# Patient Record
Sex: MALE | Race: WHITE | NOT HISPANIC OR LATINO | Employment: FULL TIME | URBAN - METROPOLITAN AREA
[De-identification: names, ages, dates, MRNs, and addresses within clinical notes are randomized per-mention and may not be internally consistent; named-entity substitution may affect disease eponyms.]

---

## 2020-06-01 ENCOUNTER — TELEPHONE (OUTPATIENT)
Dept: URGENT CARE | Facility: CLINIC | Age: 40
End: 2020-06-01

## 2020-06-01 NOTE — TELEPHONE ENCOUNTER
"Notified patient of xray per result note.  Patient states he is going to \"deal with it\" as he does not have time right now to go to ortho and have to be in a walking boot due to wife's health issues.  Patient asked if he can call back if needed and get a referral for ortho.  I advised that if he could call back in the next week or two it shouldn't be an issue.  Patient agreeable and stated understanding.   "

## 2020-09-02 ENCOUNTER — OFFICE VISIT (OUTPATIENT)
Dept: ORTHOPEDIC SURGERY | Facility: CLINIC | Age: 40
End: 2020-09-02

## 2020-09-02 VITALS — HEIGHT: 68 IN | BODY MASS INDEX: 41.98 KG/M2 | OXYGEN SATURATION: 99 % | HEART RATE: 91 BPM | WEIGHT: 277 LBS

## 2020-09-02 DIAGNOSIS — S82.892S: ICD-10-CM

## 2020-09-02 DIAGNOSIS — S93.422S SPRAIN OF DELTOID LIGAMENT OF LEFT ANKLE, SEQUELA: Primary | ICD-10-CM

## 2020-09-02 PROCEDURE — 99203 OFFICE O/P NEW LOW 30 MIN: CPT | Performed by: ORTHOPAEDIC SURGERY

## 2020-09-03 PROBLEM — S93.422A SPRAIN OF DELTOID LIGAMENT OF LEFT ANKLE: Status: ACTIVE | Noted: 2020-09-03

## 2020-09-03 PROBLEM — S82.892S: Status: ACTIVE | Noted: 2020-09-03

## 2020-09-18 ENCOUNTER — HOSPITAL ENCOUNTER (OUTPATIENT)
Dept: MRI IMAGING | Facility: HOSPITAL | Age: 40
Discharge: HOME | End: 2020-09-18
Admitting: ORTHOPAEDIC SURGERY

## 2020-09-18 DIAGNOSIS — S82.892S: ICD-10-CM

## 2020-09-18 DIAGNOSIS — S93.422S SPRAIN OF DELTOID LIGAMENT OF LEFT ANKLE, SEQUELA: ICD-10-CM

## 2020-09-18 PROCEDURE — 73721 MRI JNT OF LWR EXTRE W/O DYE: CPT

## 2020-09-23 ENCOUNTER — OFFICE VISIT (OUTPATIENT)
Dept: ORTHOPEDIC SURGERY | Facility: CLINIC | Age: 40
End: 2020-09-23

## 2020-09-23 VITALS — BODY MASS INDEX: 41.77 KG/M2 | WEIGHT: 275.57 LBS | HEART RATE: 83 BPM | HEIGHT: 68 IN | OXYGEN SATURATION: 99 %

## 2020-09-23 DIAGNOSIS — M76.822 POSTERIOR TIBIAL TENDINITIS OF LEFT LOWER EXTREMITY: Primary | ICD-10-CM

## 2020-09-23 PROCEDURE — 99212 OFFICE O/P EST SF 10 MIN: CPT | Performed by: ORTHOPAEDIC SURGERY

## 2020-09-23 NOTE — PROGRESS NOTES
ESTABLISHED PATIENT    Patient: Benjamin Rivera  : 1980    Primary Care Provider: Provider, No Known    Requesting Provider: As above    Follow-up (Left ankle MRI follow up. )      History    Chief Complaint: Left ankle pain    History of Present Illness: He returns for follow-up of his left ankle MRI.  It was done 2020 and I reviewed the films and the report.  There are several interesting findings.  With respect to his medial pain, he has synovitis around the posterior tibial and FHL tendons.  Interestingly he appears to just have been on the peroneal tendon.  Distally I can see the longus and the brevis, but proximally at the level of the ankle I only see the longus.  There is a slight amount of fluid around it.  He is not symptomatic here.  There is some abnormality in the anterior talofibular ligament, again he is not symptomatic there.    Current Outpatient Medications on File Prior to Visit   Medication Sig Dispense Refill   • hepatitis A (HAVRIX) 1440 EL U/ML vaccine Inject 1 mL into the appropriate muscle as directed by prescriber. 1 mL 0   • hepatitis A vaccine (VAQTA) 50 UNIT/ML injection Inject 1 mL into the appropriate muscle as directed by prescriber. Repeat in 6 months 1 mL 1     No current facility-administered medications on file prior to visit.       No Known Allergies   No past medical history on file.  Past Surgical History:   Procedure Laterality Date   • WISDOM TOOTH EXTRACTION       Family History   Problem Relation Age of Onset   • No Known Problems Mother    • No Known Problems Father       Social History     Socioeconomic History   • Marital status: Single     Spouse name: Not on file   • Number of children: Not on file   • Years of education: Not on file   • Highest education level: Not on file   Tobacco Use   • Smoking status: Never Smoker   • Smokeless tobacco: Never Used   Substance and Sexual Activity   • Alcohol use: Not Currently   • Drug use: Never   • Sexual activity: Defer  "       Review of Systems   Constitutional: Negative.    HENT: Negative.    Eyes: Negative.    Respiratory: Negative.    Cardiovascular: Negative.    Gastrointestinal: Negative.    Endocrine: Negative.    Genitourinary: Negative.    Musculoskeletal: Positive for arthralgias.   Skin: Negative.    Allergic/Immunologic: Negative.    Neurological: Negative.    Hematological: Negative.    Psychiatric/Behavioral: Negative.        The following portions of the patient's history were reviewed and updated as appropriate: allergies, current medications, past family history, past medical history, past social history, past surgical history and problem list.    Physical Exam:   Pulse 83   Ht 172.7 cm (67.99\")   Wt 125 kg (275 lb 9.2 oz)   SpO2 99%   BMI 41.91 kg/m²   GENERAL: Gait: antalgic with the first few steps       MSK:  Ankle:  ; Left:  Tender medially nontender laterally            NEURO Sensation:  intact     Medical Decision Making    Data Review:   reviewed radiology images and reviewed radiology results    Assessment/Plan/Diagnosis/Treatment Options:   1. Posterior tibial tendinitis of left lower extremity  His deltoid ligament is intact on the MRI but there is synovitis around the posterior tibial tendon and flexor hallucis longus tendon consistent with medial pain.  I would recommend a short leg walking cast.  I explained the usual treatment for posterior tibial tendinitis.  I would definitely recommend the cast because the risk of the foot architecture changing is significant.  I explained to him the appearance of the peroneals, he appears to just have one peroneal tendon.  I would also recommend a cast to help prevent that from getting worse.  He then definitely needs physical therapy to work on side to side strengthening, he needs custom orthotics and I gave him a prescription.  I would recommend 6 weeks short leg walking cast then 6 weeks in a boot with the custom orthotics doing therapy, and 6 weeks of " therapy out of the boot.  He reports he has been feeling better and is not certain that he wants the cast.  His wife is going to have another surgery and he will have to both work and take care of her and their young child.  He is going to think about the options.  I cautioned him about the possibility of the tendons rupturing.  If he decides to pursue the cast I will see him again in 6 weeks later for an exam out of the cast.

## 2020-10-05 ENCOUNTER — TELEPHONE (OUTPATIENT)
Dept: ORTHOPEDIC SURGERY | Facility: CLINIC | Age: 40
End: 2020-10-05

## 2020-10-05 NOTE — TELEPHONE ENCOUNTER
Called patient, he is going to come in tomorrow after his education at the hospital around 11:30.  Marlene

## 2020-10-05 NOTE — TELEPHONE ENCOUNTER
PATIENT CALLED STATED HE WAS TO GET A CAST ON LEFT ANKLE PLACED 10 DAYS AGO AND PATIENT STATED HE WOULD LIKE TO GET A CAST PLACED. PATIENT WOULD LIKE A CALL BACK [phone].

## 2020-10-06 ENCOUNTER — OFFICE VISIT (OUTPATIENT)
Dept: ORTHOPEDIC SURGERY | Facility: CLINIC | Age: 40
End: 2020-10-06

## 2020-10-06 DIAGNOSIS — M76.822 POSTERIOR TIBIAL TENDINITIS OF LEFT LOWER EXTREMITY: Primary | ICD-10-CM

## 2020-10-06 PROCEDURE — 29425 APPL SHORT LEG CAST WALKING: CPT | Performed by: PHYSICIAN ASSISTANT

## 2020-10-06 NOTE — PROGRESS NOTES
Procedure   Cast Application     Date/Time: 10/6/2020 11:24 AM  Performed by: Meri Renner PA-C  Authorized by: Meri Renner PA-C   Location details: left leg  Splint type: short leg  Supplies used: cotton padding (fiberglass)  Post-procedure: The splinted body part was neurovascularly unchanged following the procedure.  Patient tolerance: patient tolerated the procedure well with no immediate complications  Comments: Placed patient in short leg WB fiberglass cast.  Patient was comfortable upon leaving and was instructed to call with any problems.  Mralene

## 2020-10-12 ENCOUNTER — TELEPHONE (OUTPATIENT)
Dept: ORTHOPEDIC SURGERY | Facility: CLINIC | Age: 40
End: 2020-10-12

## 2020-10-12 NOTE — TELEPHONE ENCOUNTER
PATIENT REPORTS CAST CRACKED ON Friday AND HE REMOVED IT DUE TO PAIN. PATIENT IS REFUSING TO GO BACK IN CAST. PATIENT WANTS BOOT. PLEASE ADVISE.

## 2020-10-12 NOTE — TELEPHONE ENCOUNTER
I left message for patient to have him call the office and set up a time with Bethanie for boot application.    Princess

## 2020-11-12 ENCOUNTER — OFFICE VISIT (OUTPATIENT)
Dept: ORTHOPEDIC SURGERY | Facility: CLINIC | Age: 40
End: 2020-11-12

## 2020-11-12 VITALS — HEIGHT: 68 IN | BODY MASS INDEX: 43.92 KG/M2 | WEIGHT: 289.8 LBS | OXYGEN SATURATION: 99 % | HEART RATE: 83 BPM

## 2020-11-12 DIAGNOSIS — M76.822 POSTERIOR TIBIAL TENDINITIS OF LEFT LOWER EXTREMITY: Primary | ICD-10-CM

## 2020-11-12 PROCEDURE — 99212 OFFICE O/P EST SF 10 MIN: CPT | Performed by: PHYSICIAN ASSISTANT

## 2020-11-12 NOTE — PROGRESS NOTES
[facility] Note    Subjective     Chief Complaint   Patient presents with   • Follow-up     7 week f/u, Posterior tibial tendinitis of left lower extremity        HPI  Benjamin Rivera is a 40 y.o. male.  Patient returns for follow-up of posterior tib tendinitis left ankle.  Patient is being treated by Dr. Cardoza with a cast.  He reports that approximately 5 days after having the cast applied the cast cracked and broke down.  He was then placed into a boot.    He reports improvement of his symptoms.  Current pain scale 2/10.  Still has issues with prolonged walking and stair climbing.      No reported fever, chills, night sweats or other constitutional symptoms.    History reviewed. No pertinent past medical history.   Past Surgical History:   Procedure Laterality Date   • WISDOM TOOTH EXTRACTION        Family History   Problem Relation Age of Onset   • No Known Problems Mother    • No Known Problems Father      Social History     Socioeconomic History   • Marital status: Single     Spouse name: Not on file   • Number of children: Not on file   • Years of education: Not on file   • Highest education level: Not on file   Tobacco Use   • Smoking status: Never Smoker   • Smokeless tobacco: Never Used   Substance and Sexual Activity   • Alcohol use: Not Currently   • Drug use: Never   • Sexual activity: Defer      Current Outpatient Medications on File Prior to Visit   Medication Sig Dispense Refill   • hepatitis A (HAVRIX) 1440 EL U/ML vaccine Inject 1 mL into the appropriate muscle as directed by prescriber. 1 mL 0   • hepatitis A vaccine (VAQTA) 50 UNIT/ML injection Inject 1 mL into the appropriate muscle as directed by prescriber. Repeat in 6 months 1 mL 1     No current facility-administered medications on file prior to visit.       No Known Allergies     The following portions of the patient's history were reviewed and updated as appropriate: allergies, current medications, past family  "history, past medical history, past social history, past surgical history and problem list.    Review of Systems   Constitutional: Negative.    HENT: Negative.    Eyes: Negative.    Respiratory: Negative.    Cardiovascular: Negative.    Gastrointestinal: Negative.    Endocrine: Negative.    Genitourinary: Negative.    Musculoskeletal: Positive for arthralgias.   Skin: Negative.    Allergic/Immunologic: Negative.    Neurological: Negative.    Hematological: Negative.    Psychiatric/Behavioral: Negative.         Objective      Physical Exam  Pulse 83   Ht 172.7 cm (67.99\")   Wt 131 kg (289 lb 12.8 oz)   SpO2 99%   BMI 44.07 kg/m²     Body mass index is 44.07 kg/m².      Ortho Exam  Left ankle  Boot removed  Skin: Grossly intact without any redness, warmth or swelling.  Tenderness: Mild palpable discomfort noted along posterior tib tendon.  Motor/sensory: Grossly intact L2-S1.      Imaging/Studies  No new imaging today.    Assessment/Plan        ICD-10-CM ICD-9-CM   1. Posterior tibial tendinitis of left lower extremity  M76.822 726.72       Orders Placed This Encounter   Procedures   • Ambulatory Referral to Physical Therapy Evaluate and treat, Ortho        -Posterior tib tendinitis, left lower extremity.  -Patient to continue wearing walking boot for additional 6 weeks.  -He already has the prescription for the orthotics but never got them prior to going into the cast.  He needs to make an appointment to get the orthotics made now.  He needs to begin wearing the orthotics with his boot.  -Patient was referred to formal PT.  -I reviewed treatment protocol with the patient regarding boot wearing, PT and custom-made orthotics.  -Follow-up in 3 months with Dr. Cardoza.  -Questions and concerns answered.      Medical Decision Making  Management Options : physical/occupational therapy      Meri Renner PA-C  11/16/20  08:28 EST         EMR Dragon/Transcription disclaimer:  Much of this encounter note is an " electronic transcription of spoken language to printed text. Electronic transcription of spoken language may permit erroneous, or at times, nonsensical words or phrases to be inadvertently transcribed. Although I have reviewed the note for such errors, some may still exist.

## 2020-12-21 ENCOUNTER — IMMUNIZATION (OUTPATIENT)
Dept: VACCINE CLINIC | Facility: HOSPITAL | Age: 40
End: 2020-12-21

## 2020-12-21 PROCEDURE — 0001A: CPT | Performed by: INTERNAL MEDICINE

## 2020-12-21 PROCEDURE — 91300 HC SARSCOV02 VAC 30MCG/0.3ML IM: CPT | Performed by: INTERNAL MEDICINE

## 2021-01-11 ENCOUNTER — IMMUNIZATION (OUTPATIENT)
Dept: VACCINE CLINIC | Facility: HOSPITAL | Age: 41
End: 2021-01-11

## 2021-01-11 PROCEDURE — 91300 HC SARSCOV02 VAC 30MCG/0.3ML IM: CPT

## 2021-01-11 PROCEDURE — 0001A: CPT

## 2021-01-11 PROCEDURE — 0002A: CPT

## 2023-03-15 ENCOUNTER — OFFICE VISIT (OUTPATIENT)
Dept: FAMILY MEDICINE CLINIC | Facility: CLINIC | Age: 43
End: 2023-03-15
Payer: COMMERCIAL

## 2023-03-15 ENCOUNTER — LAB (OUTPATIENT)
Dept: LAB | Facility: HOSPITAL | Age: 43
End: 2023-03-15
Payer: COMMERCIAL

## 2023-03-15 VITALS
HEART RATE: 90 BPM | WEIGHT: 306 LBS | BODY MASS INDEX: 46.38 KG/M2 | HEIGHT: 68 IN | TEMPERATURE: 98.4 F | OXYGEN SATURATION: 98 % | SYSTOLIC BLOOD PRESSURE: 140 MMHG | DIASTOLIC BLOOD PRESSURE: 88 MMHG

## 2023-03-15 DIAGNOSIS — Z13.29 SCREENING FOR THYROID DISORDER: ICD-10-CM

## 2023-03-15 DIAGNOSIS — R53.83 FATIGUE, UNSPECIFIED TYPE: ICD-10-CM

## 2023-03-15 DIAGNOSIS — E55.9 VITAMIN D DEFICIENCY: ICD-10-CM

## 2023-03-15 DIAGNOSIS — Z13.0 SCREENING FOR DEFICIENCY ANEMIA: ICD-10-CM

## 2023-03-15 DIAGNOSIS — M25.50 ARTHRALGIA, UNSPECIFIED JOINT: Primary | ICD-10-CM

## 2023-03-15 DIAGNOSIS — Z13.220 SCREENING FOR LIPID DISORDERS: ICD-10-CM

## 2023-03-15 DIAGNOSIS — Z13.1 SCREENING FOR DIABETES MELLITUS: ICD-10-CM

## 2023-03-15 DIAGNOSIS — M25.50 ARTHRALGIA, UNSPECIFIED JOINT: ICD-10-CM

## 2023-03-15 DIAGNOSIS — I10 PRIMARY HYPERTENSION: ICD-10-CM

## 2023-03-15 DIAGNOSIS — Z11.59 NEED FOR HEPATITIS C SCREENING TEST: ICD-10-CM

## 2023-03-15 DIAGNOSIS — E66.01 CLASS 3 SEVERE OBESITY DUE TO EXCESS CALORIES WITHOUT SERIOUS COMORBIDITY WITH BODY MASS INDEX (BMI) OF 45.0 TO 49.9 IN ADULT: ICD-10-CM

## 2023-03-15 DIAGNOSIS — R50.9 FEVER, UNSPECIFIED FEVER CAUSE: ICD-10-CM

## 2023-03-15 PROBLEM — E66.813 CLASS 3 SEVERE OBESITY DUE TO EXCESS CALORIES WITHOUT SERIOUS COMORBIDITY WITH BODY MASS INDEX (BMI) OF 45.0 TO 49.9 IN ADULT: Status: ACTIVE | Noted: 2023-03-15

## 2023-03-15 LAB
BASOPHILS # BLD AUTO: 0.06 10*3/MM3 (ref 0–0.2)
BASOPHILS NFR BLD AUTO: 0.6 % (ref 0–1.5)
DEPRECATED RDW RBC AUTO: 38.8 FL (ref 37–54)
EOSINOPHIL # BLD AUTO: 0.23 10*3/MM3 (ref 0–0.4)
EOSINOPHIL NFR BLD AUTO: 2.2 % (ref 0.3–6.2)
ERYTHROCYTE [DISTWIDTH] IN BLOOD BY AUTOMATED COUNT: 12.3 % (ref 12.3–15.4)
ERYTHROCYTE [SEDIMENTATION RATE] IN BLOOD: 31 MM/HR (ref 0–15)
HCT VFR BLD AUTO: 44.3 % (ref 37.5–51)
HGB BLD-MCNC: 15 G/DL (ref 13–17.7)
IMM GRANULOCYTES # BLD AUTO: 0.04 10*3/MM3 (ref 0–0.05)
IMM GRANULOCYTES NFR BLD AUTO: 0.4 % (ref 0–0.5)
LYMPHOCYTES # BLD AUTO: 1.31 10*3/MM3 (ref 0.7–3.1)
LYMPHOCYTES NFR BLD AUTO: 12.7 % (ref 19.6–45.3)
MCH RBC QN AUTO: 29.6 PG (ref 26.6–33)
MCHC RBC AUTO-ENTMCNC: 33.9 G/DL (ref 31.5–35.7)
MCV RBC AUTO: 87.5 FL (ref 79–97)
MONOCYTES # BLD AUTO: 0.74 10*3/MM3 (ref 0.1–0.9)
MONOCYTES NFR BLD AUTO: 7.2 % (ref 5–12)
NEUTROPHILS NFR BLD AUTO: 7.96 10*3/MM3 (ref 1.7–7)
NEUTROPHILS NFR BLD AUTO: 76.9 % (ref 42.7–76)
NRBC BLD AUTO-RTO: 0 /100 WBC (ref 0–0.2)
PLATELET # BLD AUTO: 362 10*3/MM3 (ref 140–450)
PMV BLD AUTO: 9.5 FL (ref 6–12)
RBC # BLD AUTO: 5.06 10*6/MM3 (ref 4.14–5.8)
WBC NRBC COR # BLD: 10.34 10*3/MM3 (ref 3.4–10.8)

## 2023-03-15 PROCEDURE — 86803 HEPATITIS C AB TEST: CPT

## 2023-03-15 PROCEDURE — 82306 VITAMIN D 25 HYDROXY: CPT

## 2023-03-15 PROCEDURE — 85652 RBC SED RATE AUTOMATED: CPT

## 2023-03-15 PROCEDURE — 87040 BLOOD CULTURE FOR BACTERIA: CPT

## 2023-03-15 PROCEDURE — 86618 LYME DISEASE ANTIBODY: CPT

## 2023-03-15 PROCEDURE — 99214 OFFICE O/P EST MOD 30 MIN: CPT | Performed by: NURSE PRACTITIONER

## 2023-03-15 PROCEDURE — 86140 C-REACTIVE PROTEIN: CPT

## 2023-03-15 PROCEDURE — 84439 ASSAY OF FREE THYROXINE: CPT

## 2023-03-15 PROCEDURE — 82550 ASSAY OF CK (CPK): CPT

## 2023-03-15 PROCEDURE — 80061 LIPID PANEL: CPT

## 2023-03-15 PROCEDURE — 84550 ASSAY OF BLOOD/URIC ACID: CPT

## 2023-03-15 PROCEDURE — 86431 RHEUMATOID FACTOR QUANT: CPT

## 2023-03-15 PROCEDURE — 36415 COLL VENOUS BLD VENIPUNCTURE: CPT

## 2023-03-15 PROCEDURE — 80050 GENERAL HEALTH PANEL: CPT

## 2023-03-15 RX ORDER — IBUPROFEN 600 MG/1
600 TABLET ORAL EVERY 6 HOURS PRN
Start: 2023-03-15

## 2023-03-15 NOTE — PROGRESS NOTES
Chief Complaint   Patient presents with   • Joint Pain     Pt. States he also has Pain in the ankles and elbows.   • Knee Pain   • Shoulder Pain   • Establish Care     Pt. States he has noticed a lot more Joint Pain then usual.       Subjective   Benjamin Rivera is a 42 y.o. male    History of Present Illness  Patient able complaints of worsening joint pain.  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  He states about 3 weeks ago he started feeling some joint pain in his hips, knees and elbows.  States he felt like he was run over by a truck.  He did have multiple days of chilling but only 1 day of fever.  This was low-grade.  He does take ibuprofen 800 mg generally in the morning and this helps his discomfort as well as his chilling and fever.  He generally takes Tylenol in the afternoon.  He reports he has not checked his blood pressure recently.    No Known Allergies  Past Medical History:   Diagnosis Date   • Obesity       Past Surgical History:   Procedure Laterality Date   • WISDOM TOOTH EXTRACTION       Social History     Socioeconomic History   • Marital status: Single   Tobacco Use   • Smoking status: Never     Passive exposure: Never   • Smokeless tobacco: Never   Vaping Use   • Vaping Use: Never used   Substance and Sexual Activity   • Alcohol use: Not Currently   • Drug use: Never   • Sexual activity: Yes     Partners: Female     Birth control/protection: Post-menopausal        Current Outpatient Medications:   •  ibuprofen (ADVIL,MOTRIN) 600 MG tablet, Take 1 tablet by mouth Every 6 (Six) Hours As Needed for Mild Pain., Disp: , Rfl:      Review of Systems   Constitutional: Positive for chills, fatigue and fever. Negative for appetite change.   HENT: Negative for congestion, ear pain, hearing loss, rhinorrhea, sinus pressure and sore throat.    Eyes: Negative for itching and visual disturbance.   Respiratory: Negative for cough, shortness of breath and wheezing.    Cardiovascular: Negative for chest  pain, palpitations and leg swelling.   Gastrointestinal: Negative for abdominal pain, blood in stool, constipation, diarrhea, nausea and vomiting.   Endocrine: Negative for cold intolerance, heat intolerance, polydipsia, polyphagia and polyuria.   Genitourinary: Negative for difficulty urinating, dysuria and hematuria.   Musculoskeletal: Positive for arthralgias. Negative for back pain, joint swelling, myalgias and neck pain.   Skin: Negative for rash and wound.   Allergic/Immunologic: Negative for environmental allergies and food allergies.   Neurological: Negative for dizziness, light-headedness, numbness and headaches.   Hematological: Negative for adenopathy. Does not bruise/bleed easily.   Psychiatric/Behavioral: Negative for dysphoric mood and sleep disturbance. The patient is not nervous/anxious.        Objective     Vitals:    03/15/23 1018   BP: 140/88   Pulse: 90   Temp: 98.4 °F (36.9 °C)   SpO2: 98%         03/15/23  1018   Weight: (!) 139 kg (306 lb)     Body mass index is 46.54 kg/m².  Results for orders placed or performed in visit on 08/18/21   Hepatitis C Antibody    Specimen: Blood   Result Value Ref Range    Hepatitis C Ab Non-Reactive Non-Reactive   HIV-1 / O / 2 Ag / Antibody 4th Generation    Specimen: Blood   Result Value Ref Range    HIV-1/ HIV-2 Non-Reactive Non-Reactive       Physical Exam  Vitals reviewed.   Constitutional:       Appearance: He is well-developed.   HENT:      Head: Normocephalic and atraumatic.   Cardiovascular:      Rate and Rhythm: Normal rate and regular rhythm.      Heart sounds: Normal heart sounds.   Pulmonary:      Effort: Pulmonary effort is normal.      Breath sounds: Normal breath sounds.   Skin:     General: Skin is warm and dry.   Neurological:      Mental Status: He is alert and oriented to person, place, and time.   Psychiatric:         Behavior: Behavior normal.         Assessment & Plan   Problems Addressed this Visit        Cardiac and Vasculature     Primary hypertension       Endocrine and Metabolic    Class 3 severe obesity due to excess calories without serious comorbidity with body mass index (BMI) of 45.0 to 49.9 in adult (HCC)   Other Visit Diagnoses     Arthralgia, unspecified joint    -  Primary    Relevant Medications    ibuprofen (ADVIL,MOTRIN) 600 MG tablet    Other Relevant Orders    Comprehensive Metabolic Panel    Sedimentation Rate    Uric Acid    C-reactive Protein    Rheumatoid Factor    Lyme Disease Total Antibody With Reflex to Immunoassay    CBC & Differential    CK    Blood Culture - Blood,    Fatigue, unspecified type        Relevant Orders    Comprehensive Metabolic Panel    Screening for diabetes mellitus        Relevant Orders    Comprehensive Metabolic Panel    Screening for lipid disorders        Relevant Orders    Lipid Panel    Need for hepatitis C screening test        Relevant Orders    Hepatitis C Antibody    Vitamin D deficiency        Relevant Orders    Vitamin D,25-Hydroxy    Screening for thyroid disorder        Relevant Orders    TSH Rfx On Abnormal To Free T4    Screening for deficiency anemia        Relevant Orders    CBC & Differential    Fever, unspecified fever cause        Relevant Orders    Blood Culture - Blood,      Diagnoses       Codes Comments    Arthralgia, unspecified joint    -  Primary ICD-10-CM: M25.50  ICD-9-CM: 719.40     Fatigue, unspecified type     ICD-10-CM: R53.83  ICD-9-CM: 780.79     Screening for diabetes mellitus     ICD-10-CM: Z13.1  ICD-9-CM: V77.1     Screening for lipid disorders     ICD-10-CM: Z13.220  ICD-9-CM: V77.91     Need for hepatitis C screening test     ICD-10-CM: Z11.59  ICD-9-CM: V73.89     Vitamin D deficiency     ICD-10-CM: E55.9  ICD-9-CM: 268.9     Screening for thyroid disorder     ICD-10-CM: Z13.29  ICD-9-CM: V77.0     Screening for deficiency anemia     ICD-10-CM: Z13.0  ICD-9-CM: V78.1     Fever, unspecified fever cause     ICD-10-CM: R50.9  ICD-9-CM: 780.60     Primary  hypertension     ICD-10-CM: I10  ICD-9-CM: 401.9     Class 3 severe obesity due to excess calories without serious comorbidity with body mass index (BMI) of 45.0 to 49.9 in adult ([facility])     ICD-10-CM: E66.01, Z68.42  ICD-9-CM: 278.01, V85.42       Patient instructed to check blood pressure daily, record, and bring to next visit. If the readings run 140/90 or greater, the patient is to call my office or return sooner for evaluation. Pt advised to eat a heart healthy diet and get regular aerobic exercise.    For unknown myalgia I will check labs.  Will obtain routine labs today and make further recommendations pending results. All lab results are automatically released to [employer] immediately when they return whether they have been reviewed or not. The patient will be notified about the results, regardless of the findings. If they have not been contacted by the office within 2 weeks after the test has been performed, I want them to contact us to learn about the results.    Discussed need for weight management.  I recommend they use a weight loss aayush on their phone, eat no more than [id number] rene/day, and exercise 30 to 60 minutes 3 times a week.  Discussed weight loss with diet, recommend Weight Watchers or Mediterranean Diet, but stated that whatever method works for this patient is best. The patient agrees with all recommendations at this time. I have discussed a weight loss plan to be determined by this patient.     Time spent on visit, including counseling, education, reviewing the chart, and any recent test results, was    30    Minutes    Return visit in 2 weeks    Counseling provided on weight management and hypertension.    Jigar Schmitz, APRN   3/15/2023   12:59 EDT     Please note that portions of this document were completed with a voice recognition program.     At [facility], we believe that sharing information builds trust and better relationships. You are receiving this note because you are  receiving care at [facility] or have recently visited. It is possible you will see health information before a provider has talked with you about it. This kind of information can be easy to misunderstand. To help you fully understand what it means for your health, we urge you to discuss this note with your provider.

## 2023-03-15 NOTE — PATIENT INSTRUCTIONS
Managing Your Hypertension  Hypertension, also called high blood pressure, is when the force of the blood pressing against the walls of the arteries is too strong. Arteries are blood vessels that carry blood from your heart throughout your body. Hypertension forces the heart to work harder to pump blood and may cause the arteries to become narrow or stiff.  Understanding blood pressure readings  A blood pressure reading includes a higher number over a lower number:  The first, or top, number is called the systolic pressure. It is a measure of the pressure in your arteries as your heart beats.  The second, or bottom number, is called the diastolic pressure. It is a measure of the pressure in your arteries as the heart relaxes.  For most people, a normal blood pressure is below 120/80. Your personal target blood pressure may vary depending on your medical conditions, your age, and other factors.  Blood pressure is classified into four stages. Based on your blood pressure reading, your health care provider may use the following stages to determine what type of treatment you need, if any. Systolic pressure and diastolic pressure are measured in a unit called millimeters of mercury (mmHg).  Normal  Systolic pressure: below 120.  Diastolic pressure: below 80.  Elevated  Systolic pressure: 120-129.  Diastolic pressure: below 80.  Hypertension stage 1  Systolic pressure: 130-139.  Diastolic pressure: 80-89.  Hypertension stage 2  Systolic pressure: 140 or above.  Diastolic pressure: 90 or above.  How can this condition affect me?  Managing your hypertension is very important. Over time, hypertension can damage the arteries and decrease blood flow to parts of the body, including the brain, heart, and kidneys. Having untreated or uncontrolled hypertension can lead to:  A heart attack.  A stroke.  A weakened blood vessel (aneurysm).  Heart failure.  Kidney damage.  Eye damage.  Memory and concentration problems.  Vascular  dementia.  What actions can I take to manage this condition?  Hypertension can be managed by making lifestyle changes and possibly by taking medicines. Your health care provider will help you make a plan to bring your blood pressure within a normal range. You may be referred for counseling on a healthy diet and physical activity.  Nutrition    Eat a diet that is high in fiber and potassium, and low in salt (sodium), added sugar, and fat. An example eating plan is called the DASH diet. DASH stands for Dietary Approaches to Stop Hypertension. To eat this way:  Eat plenty of fresh fruits and vegetables. Try to fill one-half of your plate at each meal with fruits and vegetables.  Eat whole grains, such as whole-wheat pasta, brown rice, or whole-grain bread. Fill about one-fourth of your plate with whole grains.  Eat low-fat dairy products.  Avoid fatty cuts of meat, processed or cured meats, and poultry with skin. Fill about one-fourth of your plate with lean proteins such as fish, chicken without skin, beans, eggs, and tofu.  Avoid pre-made and processed foods. These tend to be higher in sodium, added sugar, and fat.  Reduce your daily sodium intake. Many people with hypertension should eat less than 1,500 mg of sodium a day.  Lifestyle    Work with your health care provider to maintain a healthy body weight or to lose weight. Ask what an ideal weight is for you.  Get at least 30 minutes of exercise that causes your heart to beat faster (aerobic exercise) most days of the week. Activities may include walking, swimming, or biking.  Include exercise to strengthen your muscles (resistance exercise), such as weight lifting, as part of your weekly exercise routine. Try to do these types of exercises for 30 minutes at least 3 days a week.  Do not use any products that contain nicotine or tobacco. These products include cigarettes, chewing tobacco, and vaping devices, such as e-cigarettes. If you need help quitting, ask your  health care provider.  Control any long-term (chronic) conditions you have, such as high cholesterol or diabetes.  Identify your sources of stress and find ways to manage stress. This may include meditation, deep breathing, or making time for fun activities.  Alcohol use  Do not drink alcohol if:  Your health care provider tells you not to drink.  You are pregnant, may be pregnant, or are planning to become pregnant.  If you drink alcohol:  Limit how much you have to:  0-1 drink a day for women.  0-2 drinks a day for men.  Know how much alcohol is in your drink. In the [place] one drink equals one 12 oz bottle of beer (355 mL), one 5 oz glass of wine (148 mL), or one 1½ oz glass of hard liquor (44 mL).  Medicines  Your health care provider may prescribe medicine if lifestyle changes are not enough to get your blood pressure under control and if:  Your systolic blood pressure is 130 or higher.  Your diastolic blood pressure is 80 or higher.  Take medicines only as told by your health care provider. Follow the directions carefully. Blood pressure medicines must be taken as told by your health care provider. The medicine does not work as well when you skip doses. Skipping doses also puts you at risk for problems.  Monitoring  Before you monitor your blood pressure:  Do not smoke, drink caffeinated beverages, or exercise within 30 minutes before taking a measurement.  Use the bathroom and empty your bladder (urinate).  Sit quietly for at least 5 minutes before taking measurements.  Monitor your blood pressure at home as told by your health care provider. To do this:  Sit with your back straight and supported.  Place your feet flat on the floor. Do not cross your legs.  Support your arm on a flat surface, such as a table. Make sure your upper arm is at heart level.  Each time you measure, take two or three readings one minute apart and record the results.  You may also need to have your blood pressure checked regularly by  your health care provider.  General information  Talk with your health care provider about your diet, exercise habits, and other lifestyle factors that may be contributing to hypertension.  Review all the medicines you take with your health care provider because there may be side effects or interactions.  Keep all follow-up visits. Your health care provider can help you create and adjust your plan for managing your high blood pressure.  Where to find more information  National Heart, Lung, and Blood [place]: www.nhlbi.nih.gov  American Heart Association: www.heart.org  Contact a health care provider if:  You think you are having a reaction to medicines you have taken.  You have repeated (recurrent) headaches.  You feel dizzy.  You have swelling in your ankles.  You have trouble with your vision.  Get help right away if:  You develop a severe headache or confusion.  You have unusual weakness or numbness, or you feel faint.  You have severe pain in your chest or abdomen.  You vomit repeatedly.  You have trouble breathing.  These symptoms may be an emergency. Get help right away. Call 911.  Do not wait to see if the symptoms will go away.  Do not drive yourself to the hospital.  Summary  Hypertension is when the force of blood pumping through your arteries is too strong. If this condition is not controlled, it may put you at risk for serious complications.  Your personal target blood pressure may vary depending on your medical conditions, your age, and other factors. For most people, a normal blood pressure is less than 120/80.  Hypertension is managed by lifestyle changes, medicines, or both.  Lifestyle changes to help manage hypertension include losing weight, eating a healthy, low-sodium diet, exercising more, stopping smoking, and limiting alcohol.  This information is not intended to replace advice given to you by your health care provider. Make sure you discuss any questions you have with your health care  provider.  Document Revised: 09/01/2022 Document Reviewed: 09/01/2022  Elsevier Patient Education © 2022 [employer]  Obesity, Adult  Obesity is the condition of having too much total body fat. Being overweight or obese means that your weight is greater than what is considered healthy for your body size. Obesity is determined by a measurement called BMI (body mass index). BMI is an estimate of body fat and is calculated from height and weight. For adults, a BMI of 30 or higher is considered obese.  Obesity can lead to other health concerns and major illnesses, including:  Stroke.  Coronary artery disease (CAD).  Type 2 diabetes.  Some types of cancer, including cancers of the colon, breast, uterus, and gallbladder.  High blood pressure (hypertension).  High cholesterol.  Gallbladder stones.  Obesity can also contribute to:  Osteoarthritis.  Sleep apnea.  Infertility problems.  What are the causes?  Common causes of this condition include:  Eating daily meals that are high in calories, sugar, and fat.  Drinking high amounts of sugar-sweetened beverages, such as soft drinks.  Being born with genes that may make you more likely to become obese.  Having a medical condition that causes obesity, including:  Hypothyroidism.  Polycystic ovarian syndrome (PCOS).  Binge-eating disorder.  Cushing syndrome.  Taking certain medicines, such as steroids, antidepressants, and seizure medicines.  Not being physically active (sedentary lifestyle).  Not getting enough sleep.  What increases the risk?  The following factors may make you more likely to develop this condition:  Having a family history of obesity.  Living in an area with limited access to:  [place], recreation centers, or sidewalks.  Healthy food choices, such as grocery stores and farmers' markets.  What are the signs or symptoms?  The main sign of this condition is having too much body fat.  How is this diagnosed?  This condition is diagnosed based on:  Your BMI. If you  are an adult with a BMI of 30 or higher, you are considered obese.  Your waist circumference. This measures the distance around your waistline.  Your skinfold thickness. Your health care provider may gently pinch a fold of your skin and measure it.  You may have other tests to check for underlying conditions.  How is this treated?  Treatment for this condition often includes changing your lifestyle. Treatment may include some or all of the following:  Dietary changes. This may include developing a healthy meal plan.  Regular physical activity. This may include activity that causes your heart to beat faster (aerobic exercise) and strength training. Work with your health care provider to design an exercise program that works for you.  Medicine to help you lose weight if you are unable to lose one pound a week after six weeks of healthy eating and more physical activity.  Treating conditions that cause the obesity (underlying conditions).  Surgery. Surgical options may include gastric banding and gastric bypass. Surgery may be done if:  Other treatments have not helped to improve your condition.  You have a BMI of 40 or higher.  You have life-threatening health problems related to obesity.  Follow these instructions at home:  Eating and drinking    Follow recommendations from your health care provider about what you eat and drink. Your health care provider may advise you to:  Limit fast food, sweets, and processed snack foods.  Choose low-fat options, such as low-fat milk instead of whole milk.  Eat five or more servings of fruits or vegetables every day.  Choose healthy foods when you eat out.  Keep low-fat snacks available.  Limit sugary drinks, such as soda, fruit juice, sweetened iced tea, and flavored milk.  Drink enough water to keep your urine pale yellow.  Do not follow a fad diet. Fad diets can be unhealthy and even dangerous.  Other healthful choices include:  Eat at home more often. This gives you more  control over what you eat.  Learn to read food labels. This will help you understand how much food is considered one serving.  Learn what a healthy serving size is.  Physical activity  Exercise regularly, as told by your health care provider.  Most adults should get up to 150 minutes of moderate-intensity exercise every week.  Ask your health care provider what types of exercise are safe for you and how often you should exercise.  Warm up and stretch before being active.  Cool down and stretch after being active.  Rest between periods of activity.  Lifestyle  Work with your health care provider and a dietitian to set a weight-loss goal that is healthy and reasonable for you.  Limit your screen time.  Find ways to reward yourself that do not involve food.  Do not drink alcohol if:  Your health care provider tells you not to drink.  You are pregnant, may be pregnant, or are planning to become pregnant.  If you drink alcohol:  Limit how much you have to:  0-1 drink a day for women.  0-2 drinks a day for men.  Know how much alcohol is in your drink. In the [place] one drink equals one 12 oz bottle of beer (355 mL), one 5 oz glass of wine (148 mL), or one 1½ oz glass of hard liquor (44 mL).  General instructions  Keep a weight-loss journal to keep track of the food you eat and how much exercise you get.  Take over-the-counter and prescription medicines only as told by your health care provider.  Take vitamins and supplements only as told by your health care provider.  Consider joining a support group. Your health care provider may be able to recommend a support group.  Pay attention to your mental health as obesity can lead to depression or self esteem issues.  Keep all follow-up visits. This is important.  Contact a health care provider if:  You are unable to meet your weight-loss goal after six weeks of dietary and lifestyle changes.  You have trouble breathing.  Summary  Obesity is the condition of having too much total  "body fat.  Being overweight or obese means that your weight is greater than what is considered healthy for your body size.  Work with your health care provider and a dietitian to set a weight-loss goal that is healthy and reasonable for you.  Exercise regularly, as told by your health care provider. Ask your health care provider what types of exercise are safe for you and how often you should exercise.  This information is not intended to replace advice given to you by your health care provider. Make sure you discuss any questions you have with your health care provider.  Document Revised: 07/26/2022 Document Reviewed: 07/26/2022  [employer] Patient Education © 2022 [employer]  DASH Eating Plan  DASH stands for Dietary Approaches to Stop Hypertension. The DASH eating plan is a healthy eating plan that has been shown to:  Reduce high blood pressure (hypertension).  Reduce your risk for type 2 diabetes, heart disease, and stroke.  Help with weight loss.  What are tips for following this plan?  Reading food labels  Check food labels for the amount of salt (sodium) per serving. Choose foods with less than 5 percent of the Daily Value of sodium. Generally, foods with less than 300 milligrams (mg) of sodium per serving fit into this eating plan.  To find whole grains, look for the word \"whole\" as the first word in the ingredient list.  Shopping  Buy products labeled as \"low-sodium\" or \"no salt added.\"  Buy fresh foods. Avoid canned foods and pre-made or frozen meals.  Cooking  Avoid adding salt when cooking. Use salt-free seasonings or herbs instead of table salt or sea salt. Check with your health care provider or pharmacist before using salt substitutes.  Do not wallis foods. Cook foods using healthy methods such as baking, boiling, grilling, roasting, and broiling instead.  Cook with heart-healthy oils, such as olive, canola, avocado, soybean, or sunflower oil.  Meal planning    Eat a balanced diet that includes:  4 or " more servings of fruits and 4 or more servings of vegetables each day. Try to fill one-half of your plate with fruits and vegetables.  6-8 servings of whole grains each day.  Less than 6 oz (170 g) of lean meat, poultry, or fish each day. A 3-oz (85-g) serving of meat is about the same size as a deck of cards. One egg equals 1 oz (28 g).  2-3 servings of low-fat dairy each day. One serving is 1 cup (237 mL).  1 serving of nuts, seeds, or beans 5 times each week.  2-3 servings of heart-healthy fats. Healthy fats called omega-3 fatty acids are found in foods such as walnuts, flaxseeds, fortified milks, and eggs. These fats are also found in cold-water fish, such as sardines, salmon, and mackerel.  Limit how much you eat of:  Canned or prepackaged foods.  Food that is high in trans fat, such as some fried foods.  Food that is high in saturated fat, such as fatty meat.  Desserts and other sweets, sugary drinks, and other foods with added sugar.  Full-fat dairy products.  Do not salt foods before eating.  Do not eat more than 4 egg yolks a week.  Try to eat at least 2 vegetarian meals a week.  Eat more home-cooked food and less restaurant, buffet, and fast food.  Lifestyle  When eating at a restaurant, ask that your food be prepared with less salt or no salt, if possible.  If you drink alcohol:  Limit how much you use to:  0-1 drink a day for women who are not pregnant.  0-2 drinks a day for men.  Be aware of how much alcohol is in your drink. In the [place] one drink equals one 12 oz bottle of beer (355 mL), one 5 oz glass of wine (148 mL), or one 1½ oz glass of hard liquor (44 mL).  General information  Avoid eating more than 2,300 mg of salt a day. If you have hypertension, you may need to reduce your sodium intake to 1,500 mg a day.  Work with your health care provider to maintain a healthy body weight or to lose weight. Ask what an ideal weight is for you.  Get at least 30 minutes of exercise that causes your heart to  beat faster (aerobic exercise) most days of the week. Activities may include walking, swimming, or biking.  Work with your health care provider or dietitian to adjust your eating plan to your individual calorie needs.  What foods should I eat?  Fruits  All fresh, dried, or frozen fruit. Canned fruit in natural juice (without added sugar).  Vegetables  Fresh or frozen vegetables (raw, steamed, roasted, or grilled). Low-sodium or reduced-sodium tomato and vegetable juice. Low-sodium or reduced-sodium tomato sauce and tomato paste. Low-sodium or reduced-sodium canned vegetables.  Grains  Whole-grain or whole-wheat bread. Whole-grain or whole-wheat pasta. Brown rice. Oatmeal. Quinoa. Bulgur. Whole-grain and low-sodium cereals. Lluvia bread. Low-fat, low-sodium crackers. Whole-wheat flour tortillas.  Meats and other proteins  Skinless chicken or turkey. Ground chicken or turkey. Pork with fat trimmed off. Fish and seafood. Egg whites. Dried beans, peas, or lentils. Unsalted nuts, nut butters, and seeds. Unsalted canned beans. Lean cuts of beef with fat trimmed off. Low-sodium, lean precooked or cured meat, such as sausages or meat loaves.  Dairy  Low-fat (1%) or fat-free (skim) milk. Reduced-fat, low-fat, or fat-free cheeses. Nonfat, low-sodium ricotta or cottage cheese. Low-fat or nonfat yogurt. Low-fat, low-sodium cheese.  Fats and oils  Soft margarine without trans fats. Vegetable oil. Reduced-fat, low-fat, or light mayonnaise and salad dressings (reduced-sodium). Canola, safflower, olive, avocado, soybean, and sunflower oils. Avocado.  Seasonings and condiments  Herbs. Spices. Seasoning mixes without salt.  Other foods  Unsalted popcorn and pretzels. Fat-free sweets.  The items listed above may not be a complete list of foods and beverages you can eat. Contact a dietitian for more information.  What foods should I avoid?  Fruits  Canned fruit in a light or heavy syrup. Fried fruit. Fruit in cream or butter  sauce.  Vegetables  Creamed or fried vegetables. Vegetables in a cheese sauce. Regular canned vegetables (not low-sodium or reduced-sodium). Regular canned tomato sauce and paste (not low-sodium or reduced-sodium). Regular tomato and vegetable juice (not low-sodium or reduced-sodium). Pickles. Olives.  Grains  Baked goods made with fat, such as croissants, muffins, or some breads. Dry pasta or rice meal packs.  Meats and other proteins  Fatty cuts of meat. Ribs. Fried meat. Clemente. Bologna, salami, and other precooked or cured meats, such as sausages or meat loaves. Fat from the back of a pig (fatback). Bratwurst. Salted nuts and seeds. Canned beans with added salt. Canned or smoked fish. Whole eggs or egg yolks. Chicken or turkey with skin.  Dairy  Whole or 2% milk, cream, and half-and-half. Whole or full-fat cream cheese. Whole-fat or sweetened yogurt. Full-fat cheese. Nondairy creamers. Whipped toppings. Processed cheese and cheese spreads.  Fats and oils  Butter. Stick margarine. Lard. Shortening. Ghee. Clemente fat. Tropical oils, such as coconut, palm kernel, or palm oil.  Seasonings and condiments  Onion salt, garlic salt, seasoned salt, table salt, and sea salt. [facility] sauce. Tartar sauce. Barbecue sauce. Teriyaki sauce. Soy sauce, including reduced-sodium. Steak sauce. Canned and packaged gravies. Fish sauce. Oyster sauce. Cocktail sauce. Store-bought horseradish. Ketchup. Mustard. Meat flavorings and tenderizers. Bouillon cubes. Hot sauces. Pre-made or packaged marinades. Pre-made or packaged taco seasonings. Relishes. Regular salad dressings.  Other foods  Salted popcorn and pretzels.  The items listed above may not be a complete list of foods and beverages you should avoid. Contact a dietitian for more information.  Where to find more information  National Heart, Lung, and Blood [place]: www.nhlbi.nih.gov  American Heart Association: www.heart.org  Academy of Nutrition and Dietetics:  www.eatright.org  National Kidney Foundation: www.kidney.org  Summary  The DASH eating plan is a healthy eating plan that has been shown to reduce high blood pressure (hypertension). It may also reduce your risk for type 2 diabetes, heart disease, and stroke.  When on the DASH eating plan, aim to eat more fresh fruits and vegetables, whole grains, lean proteins, low-fat dairy, and heart-healthy fats.  With the DASH eating plan, you should limit salt (sodium) intake to 2,300 mg a day. If you have hypertension, you may need to reduce your sodium intake to 1,500 mg a day.  Work with your health care provider or dietitian to adjust your eating plan to your individual calorie needs.  This information is not intended to replace advice given to you by your health care provider. Make sure you discuss any questions you have with your health care provider.  Document Revised: 11/20/2020 Document Reviewed: 11/20/2020  Elsevier Patient Education © 2022 [employer].

## 2023-03-16 DIAGNOSIS — E03.9 ACQUIRED HYPOTHYROIDISM: Primary | ICD-10-CM

## 2023-03-16 DIAGNOSIS — E55.9 VITAMIN D DEFICIENCY: ICD-10-CM

## 2023-03-16 DIAGNOSIS — M25.50 ARTHRALGIA, UNSPECIFIED JOINT: Primary | ICD-10-CM

## 2023-03-16 LAB
25(OH)D3 SERPL-MCNC: 12.9 NG/ML (ref 30–100)
ALBUMIN SERPL-MCNC: 4.1 G/DL (ref 3.5–5.2)
ALBUMIN/GLOB SERPL: 1.4 G/DL
ALP SERPL-CCNC: 76 U/L (ref 39–117)
ALT SERPL W P-5'-P-CCNC: 42 U/L (ref 1–41)
ANION GAP SERPL CALCULATED.3IONS-SCNC: 13 MMOL/L (ref 5–15)
AST SERPL-CCNC: 22 U/L (ref 1–40)
BILIRUB SERPL-MCNC: 0.2 MG/DL (ref 0–1.2)
BUN SERPL-MCNC: 16 MG/DL (ref 6–20)
BUN/CREAT SERPL: 18.4 (ref 7–25)
CALCIUM SPEC-SCNC: 9.4 MG/DL (ref 8.6–10.5)
CHLORIDE SERPL-SCNC: 100 MMOL/L (ref 98–107)
CHOLEST SERPL-MCNC: 186 MG/DL (ref 0–200)
CHROMATIN AB SERPL-ACNC: <10 IU/ML (ref 0–14)
CK SERPL-CCNC: 31 U/L (ref 20–200)
CO2 SERPL-SCNC: 26 MMOL/L (ref 22–29)
CREAT SERPL-MCNC: 0.87 MG/DL (ref 0.76–1.27)
CRP SERPL-MCNC: 5.54 MG/DL (ref 0–0.5)
EGFRCR SERPLBLD CKD-EPI 2021: 109.8 ML/MIN/1.73
GLOBULIN UR ELPH-MCNC: 2.9 GM/DL
GLUCOSE SERPL-MCNC: 90 MG/DL (ref 65–99)
HCV AB SER DONR QL: NORMAL
HDLC SERPL-MCNC: 39 MG/DL (ref 40–60)
LDLC SERPL CALC-MCNC: 122 MG/DL (ref 0–100)
LDLC/HDLC SERPL: 3.06 {RATIO}
POTASSIUM SERPL-SCNC: 4.7 MMOL/L (ref 3.5–5.2)
PROT SERPL-MCNC: 7 G/DL (ref 6–8.5)
SODIUM SERPL-SCNC: 139 MMOL/L (ref 136–145)
T4 FREE SERPL-MCNC: 0.91 NG/DL (ref 0.93–1.7)
TRIGL SERPL-MCNC: 138 MG/DL (ref 0–150)
TSH SERPL DL<=0.05 MIU/L-ACNC: 4.78 UIU/ML (ref 0.27–4.2)
URATE SERPL-MCNC: 5.7 MG/DL (ref 3.4–7)
VLDLC SERPL-MCNC: 25 MG/DL (ref 5–40)

## 2023-03-16 RX ORDER — ERGOCALCIFEROL 1.25 MG/1
50000 CAPSULE ORAL WEEKLY
Qty: 12 CAPSULE | Refills: 0 | Status: SHIPPED | OUTPATIENT
Start: 2023-03-16

## 2023-03-16 NOTE — PROGRESS NOTES
I did call the patient and discussed his results with him.  We are going to put him on prescription strength vitamin D for 12 weeks then repeat a level.  We are going to get an ultrasound of his thyroid and repeat thyroid labs including thyroid antibodies.  For his elevated inflammatory markers and we will send him to rheumatology.

## 2023-03-17 DIAGNOSIS — A69.23 LYME ARTHRITIS: Primary | ICD-10-CM

## 2023-03-17 LAB
B BURGDOR AB SER-IMP: ABNORMAL
B BURGDOR IGG SERPL QL IA: POSITIVE
B BURGDOR IGG+IGM SER QL IA: POSITIVE
B BURGDOR IGM SERPL QL IA: NEGATIVE

## 2023-03-17 RX ORDER — DOXYCYCLINE HYCLATE 100 MG
100 TABLET ORAL 2 TIMES DAILY
Qty: 20 TABLET | Refills: 0 | Status: SHIPPED | OUTPATIENT
Start: 2023-03-17 | End: 2023-03-27

## 2023-03-20 LAB — BACTERIA SPEC AEROBE CULT: NORMAL

## 2023-03-24 ENCOUNTER — LAB (OUTPATIENT)
Dept: LAB | Facility: HOSPITAL | Age: 43
End: 2023-03-24
Payer: COMMERCIAL

## 2023-03-24 ENCOUNTER — TRANSCRIBE ORDERS (OUTPATIENT)
Dept: LAB | Facility: HOSPITAL | Age: 43
End: 2023-03-24
Payer: COMMERCIAL

## 2023-03-24 DIAGNOSIS — R70.0 ELEVATED SEDIMENTATION RATE: ICD-10-CM

## 2023-03-24 DIAGNOSIS — R74.02 NONSPECIFIC ELEVATION OF LEVELS OF TRANSAMINASE OR LACTIC ACID DEHYDROGENASE (LDH): ICD-10-CM

## 2023-03-24 DIAGNOSIS — A69.23: ICD-10-CM

## 2023-03-24 DIAGNOSIS — M77.9 ACUTE CALCIFIC PERIARTHRITIS: Primary | ICD-10-CM

## 2023-03-24 DIAGNOSIS — M77.9 ACUTE CALCIFIC PERIARTHRITIS: ICD-10-CM

## 2023-03-24 DIAGNOSIS — R74.01 NONSPECIFIC ELEVATION OF LEVELS OF TRANSAMINASE OR LACTIC ACID DEHYDROGENASE (LDH): ICD-10-CM

## 2023-03-24 DIAGNOSIS — E03.9 ACQUIRED HYPOTHYROIDISM: ICD-10-CM

## 2023-03-24 LAB
T3FREE SERPL-MCNC: 2.99 PG/ML (ref 2–4.4)
T4 FREE SERPL-MCNC: 0.95 NG/DL (ref 0.93–1.7)
TSH SERPL DL<=0.05 MIU/L-ACNC: 5.5 UIU/ML (ref 0.27–4.2)

## 2023-03-24 PROCEDURE — 84481 FREE ASSAY (FT-3): CPT

## 2023-03-24 PROCEDURE — 86800 THYROGLOBULIN ANTIBODY: CPT

## 2023-03-24 PROCEDURE — 84439 ASSAY OF FREE THYROXINE: CPT

## 2023-03-24 PROCEDURE — 86376 MICROSOMAL ANTIBODY EACH: CPT

## 2023-03-24 PROCEDURE — 86063 ANTISTREPTOLYSIN O SCREEN: CPT

## 2023-03-24 PROCEDURE — 36415 COLL VENOUS BLD VENIPUNCTURE: CPT

## 2023-03-24 PROCEDURE — 87040 BLOOD CULTURE FOR BACTERIA: CPT

## 2023-03-24 PROCEDURE — 84443 ASSAY THYROID STIM HORMONE: CPT

## 2023-03-25 LAB — ASO AB SERPL-ACNC: NEGATIVE [IU]/ML

## 2023-03-27 ENCOUNTER — OFFICE VISIT (OUTPATIENT)
Dept: FAMILY MEDICINE CLINIC | Facility: CLINIC | Age: 43
End: 2023-03-27
Payer: COMMERCIAL

## 2023-03-27 VITALS
DIASTOLIC BLOOD PRESSURE: 88 MMHG | SYSTOLIC BLOOD PRESSURE: 132 MMHG | HEART RATE: 78 BPM | TEMPERATURE: 98.4 F | OXYGEN SATURATION: 100 % | HEIGHT: 68 IN | RESPIRATION RATE: 20 BRPM | BODY MASS INDEX: 46.8 KG/M2 | WEIGHT: 308.8 LBS

## 2023-03-27 DIAGNOSIS — I10 PRIMARY HYPERTENSION: ICD-10-CM

## 2023-03-27 DIAGNOSIS — E55.9 VITAMIN D DEFICIENCY: ICD-10-CM

## 2023-03-27 DIAGNOSIS — E66.01 CLASS 3 SEVERE OBESITY DUE TO EXCESS CALORIES WITHOUT SERIOUS COMORBIDITY WITH BODY MASS INDEX (BMI) OF 45.0 TO 49.9 IN ADULT: ICD-10-CM

## 2023-03-27 DIAGNOSIS — Z00.00 WELLNESS EXAMINATION: Primary | ICD-10-CM

## 2023-03-27 DIAGNOSIS — E03.9 ACQUIRED HYPOTHYROIDISM: Primary | ICD-10-CM

## 2023-03-27 DIAGNOSIS — E03.9 ACQUIRED HYPOTHYROIDISM: ICD-10-CM

## 2023-03-27 LAB
THYROGLOB AB SERPL-ACNC: 4.5 IU/ML (ref 0–0.9)
THYROPEROXIDASE AB SERPL-ACNC: 54 IU/ML (ref 0–34)

## 2023-03-27 PROCEDURE — 99396 PREV VISIT EST AGE 40-64: CPT | Performed by: NURSE PRACTITIONER

## 2023-03-27 RX ORDER — LEVOTHYROXINE SODIUM 0.03 MG/1
25 TABLET ORAL DAILY
Qty: 30 TABLET | Refills: 5 | Status: SHIPPED | OUTPATIENT
Start: 2023-03-27

## 2023-03-27 NOTE — PATIENT INSTRUCTIONS
Obesity, Adult  Obesity is the condition of having too much total body fat. Being overweight or obese means that your weight is greater than what is considered healthy for your body size. Obesity is determined by a measurement called BMI (body mass index). BMI is an estimate of body fat and is calculated from height and weight. For adults, a BMI of 30 or higher is considered obese.  Obesity can lead to other health concerns and major illnesses, including:  Stroke.  Coronary artery disease (CAD).  Type 2 diabetes.  Some types of cancer, including cancers of the colon, breast, uterus, and gallbladder.  High blood pressure (hypertension).  High cholesterol.  Gallbladder stones.  Obesity can also contribute to:  Osteoarthritis.  Sleep apnea.  Infertility problems.  What are the causes?  Common causes of this condition include:  Eating daily meals that are high in calories, sugar, and fat.  Drinking high amounts of sugar-sweetened beverages, such as soft drinks.  Being born with genes that may make you more likely to become obese.  Having a medical condition that causes obesity, including:  Hypothyroidism.  Polycystic ovarian syndrome (PCOS).  Binge-eating disorder.  Cushing syndrome.  Taking certain medicines, such as steroids, antidepressants, and seizure medicines.  Not being physically active (sedentary lifestyle).  Not getting enough sleep.  What increases the risk?  The following factors may make you more likely to develop this condition:  Having a family history of obesity.  Living in an area with limited access to:  [place], recreation centers, or sidewalks.  Healthy food choices, such as grocery stores and [employer] markets.  What are the signs or symptoms?  The main sign of this condition is having too much body fat.  How is this diagnosed?  This condition is diagnosed based on:  Your BMI. If you are an adult with a BMI of 30 or higher, you are considered obese.  Your waist circumference. This measures the  distance around your waistline.  Your skinfold thickness. Your health care provider may gently pinch a fold of your skin and measure it.  You may have other tests to check for underlying conditions.  How is this treated?  Treatment for this condition often includes changing your lifestyle. Treatment may include some or all of the following:  Dietary changes. This may include developing a healthy meal plan.  Regular physical activity. This may include activity that causes your heart to beat faster (aerobic exercise) and strength training. Work with your health care provider to design an exercise program that works for you.  Medicine to help you lose weight if you are unable to lose one pound a week after six weeks of healthy eating and more physical activity.  Treating conditions that cause the obesity (underlying conditions).  Surgery. Surgical options may include gastric banding and gastric bypass. Surgery may be done if:  Other treatments have not helped to improve your condition.  You have a BMI of 40 or higher.  You have life-threatening health problems related to obesity.  Follow these instructions at home:  Eating and drinking    Follow recommendations from your health care provider about what you eat and drink. Your health care provider may advise you to:  Limit fast food, sweets, and processed snack foods.  Choose low-fat options, such as low-fat milk instead of whole milk.  Eat five or more servings of fruits or vegetables every day.  Choose healthy foods when you eat out.  Keep low-fat snacks available.  Limit sugary drinks, such as soda, fruit juice, sweetened iced tea, and flavored milk.  Drink enough water to keep your urine pale yellow.  Do not follow a fad diet. Fad diets can be unhealthy and even dangerous.  Other healthful choices include:  Eat at home more often. This gives you more control over what you eat.  Learn to read food labels. This will help you understand how much food is considered one  serving.  Learn what a healthy serving size is.  Physical activity  Exercise regularly, as told by your health care provider.  Most adults should get up to 150 minutes of moderate-intensity exercise every week.  Ask your health care provider what types of exercise are safe for you and how often you should exercise.  Warm up and stretch before being active.  Cool down and stretch after being active.  Rest between periods of activity.  Lifestyle  Work with your health care provider and a dietitian to set a weight-loss goal that is healthy and reasonable for you.  Limit your screen time.  Find ways to reward yourself that do not involve food.  Do not drink alcohol if:  Your health care provider tells you not to drink.  You are pregnant, may be pregnant, or are planning to become pregnant.  If you drink alcohol:  Limit how much you have to:  0-1 drink a day for women.  0-2 drinks a day for men.  Know how much alcohol is in your drink. In the [place] one drink equals one 12 oz bottle of beer (355 mL), one 5 oz glass of wine (148 mL), or one 1½ oz glass of hard liquor (44 mL).  General instructions  Keep a weight-loss journal to keep track of the food you eat and how much exercise you get.  Take over-the-counter and prescription medicines only as told by your health care provider.  Take vitamins and supplements only as told by your health care provider.  Consider joining a support group. Your health care provider may be able to recommend a support group.  Pay attention to your mental health as obesity can lead to depression or self esteem issues.  Keep all follow-up visits. This is important.  Contact a health care provider if:  You are unable to meet your weight-loss goal after six weeks of dietary and lifestyle changes.  You have trouble breathing.  Summary  Obesity is the condition of having too much total body fat.  Being overweight or obese means that your weight is greater than what is considered healthy for your body  size.  Work with your health care provider and a dietitian to set a weight-loss goal that is healthy and reasonable for you.  Exercise regularly, as told by your health care provider. Ask your health care provider what types of exercise are safe for you and how often you should exercise.  This information is not intended to replace advice given to you by your health care provider. Make sure you discuss any questions you have with your health care provider.  Document Revised: 07/26/2022 Document Reviewed: 07/26/2022  [employer] Patient Education © 2022 [employer]  Managing Your Hypertension  Hypertension, also called high blood pressure, is when the force of the blood pressing against the walls of the arteries is too strong. Arteries are blood vessels that carry blood from your heart throughout your body. Hypertension forces the heart to work harder to pump blood and may cause the arteries to become narrow or stiff.  Understanding blood pressure readings  A blood pressure reading includes a higher number over a lower number:  The first, or top, number is called the systolic pressure. It is a measure of the pressure in your arteries as your heart beats.  The second, or bottom number, is called the diastolic pressure. It is a measure of the pressure in your arteries as the heart relaxes.  For most people, a normal blood pressure is below 120/80. Your personal target blood pressure may vary depending on your medical conditions, your age, and other factors.  Blood pressure is classified into four stages. Based on your blood pressure reading, your health care provider may use the following stages to determine what type of treatment you need, if any. Systolic pressure and diastolic pressure are measured in a unit called millimeters of mercury (mmHg).  Normal  Systolic pressure: below 120.  Diastolic pressure: below 80.  Elevated  Systolic pressure: 120-129.  Diastolic pressure: below 80.  Hypertension stage 1  Systolic  pressure: 130-139.  Diastolic pressure: 80-89.  Hypertension stage 2  Systolic pressure: 140 or above.  Diastolic pressure: 90 or above.  How can this condition affect me?  Managing your hypertension is very important. Over time, hypertension can damage the arteries and decrease blood flow to parts of the body, including the brain, heart, and kidneys. Having untreated or uncontrolled hypertension can lead to:  A heart attack.  A stroke.  A weakened blood vessel (aneurysm).  Heart failure.  Kidney damage.  Eye damage.  Memory and concentration problems.  Vascular dementia.  What actions can I take to manage this condition?  Hypertension can be managed by making lifestyle changes and possibly by taking medicines. Your health care provider will help you make a plan to bring your blood pressure within a normal range. You may be referred for counseling on a healthy diet and physical activity.  Nutrition    Eat a diet that is high in fiber and potassium, and low in salt (sodium), added sugar, and fat. An example eating plan is called the DASH diet. DASH stands for Dietary Approaches to Stop Hypertension. To eat this way:  Eat plenty of fresh fruits and vegetables. Try to fill one-half of your plate at each meal with fruits and vegetables.  Eat whole grains, such as whole-wheat pasta, brown rice, or whole-grain bread. Fill about one-fourth of your plate with whole grains.  Eat low-fat dairy products.  Avoid fatty cuts of meat, processed or cured meats, and poultry with skin. Fill about one-fourth of your plate with lean proteins such as fish, chicken without skin, beans, eggs, and tofu.  Avoid pre-made and processed foods. These tend to be higher in sodium, added sugar, and fat.  Reduce your daily sodium intake. Many people with hypertension should eat less than 1,500 mg of sodium a day.  Lifestyle    Work with your health care provider to maintain a healthy body weight or to lose weight. Ask what an ideal weight is for  you.  Get at least 30 minutes of exercise that causes your heart to beat faster (aerobic exercise) most days of the week. Activities may include walking, swimming, or biking.  Include exercise to strengthen your muscles (resistance exercise), such as weight lifting, as part of your weekly exercise routine. Try to do these types of exercises for 30 minutes at least 3 days a week.  Do not use any products that contain nicotine or tobacco. These products include cigarettes, chewing tobacco, and vaping devices, such as e-cigarettes. If you need help quitting, ask your health care provider.  Control any long-term (chronic) conditions you have, such as high cholesterol or diabetes.  Identify your sources of stress and find ways to manage stress. This may include meditation, deep breathing, or making time for fun activities.  Alcohol use  Do not drink alcohol if:  Your health care provider tells you not to drink.  You are pregnant, may be pregnant, or are planning to become pregnant.  If you drink alcohol:  Limit how much you have to:  0-1 drink a day for women.  0-2 drinks a day for men.  Know how much alcohol is in your drink. In the [place] one drink equals one 12 oz bottle of beer (355 mL), one 5 oz glass of wine (148 mL), or one 1½ oz glass of hard liquor (44 mL).  Medicines  Your health care provider may prescribe medicine if lifestyle changes are not enough to get your blood pressure under control and if:  Your systolic blood pressure is 130 or higher.  Your diastolic blood pressure is 80 or higher.  Take medicines only as told by your health care provider. Follow the directions carefully. Blood pressure medicines must be taken as told by your health care provider. The medicine does not work as well when you skip doses. Skipping doses also puts you at risk for problems.  Monitoring  Before you monitor your blood pressure:  Do not smoke, drink caffeinated beverages, or exercise within 30 minutes before taking a  measurement.  Use the bathroom and empty your bladder (urinate).  Sit quietly for at least 5 minutes before taking measurements.  Monitor your blood pressure at home as told by your health care provider. To do this:  Sit with your back straight and supported.  Place your feet flat on the floor. Do not cross your legs.  Support your arm on a flat surface, such as a table. Make sure your upper arm is at heart level.  Each time you measure, take two or three readings one minute apart and record the results.  You may also need to have your blood pressure checked regularly by your health care provider.  General information  Talk with your health care provider about your diet, exercise habits, and other lifestyle factors that may be contributing to hypertension.  Review all the medicines you take with your health care provider because there may be side effects or interactions.  Keep all follow-up visits. Your health care provider can help you create and adjust your plan for managing your high blood pressure.  Where to find more information  National Heart, Lung, and Blood [place]: www.nhlbi.nih.gov  American Heart Association: www.heart.org  Contact a health care provider if:  You think you are having a reaction to medicines you have taken.  You have repeated (recurrent) headaches.  You feel dizzy.  You have swelling in your ankles.  You have trouble with your vision.  Get help right away if:  You develop a severe headache or confusion.  You have unusual weakness or numbness, or you feel faint.  You have severe pain in your chest or abdomen.  You vomit repeatedly.  You have trouble breathing.  These symptoms may be an emergency. Get help right away. Call 911.  Do not wait to see if the symptoms will go away.  Do not drive yourself to the hospital.  Summary  Hypertension is when the force of blood pumping through your arteries is too strong. If this condition is not controlled, it may put you at risk for serious  "complications.  Your personal target blood pressure may vary depending on your medical conditions, your age, and other factors. For most people, a normal blood pressure is less than 120/80.  Hypertension is managed by lifestyle changes, medicines, or both.  Lifestyle changes to help manage hypertension include losing weight, eating a healthy, low-sodium diet, exercising more, stopping smoking, and limiting alcohol.  This information is not intended to replace advice given to you by your health care provider. Make sure you discuss any questions you have with your health care provider.  Document Revised: 09/01/2022 Document Reviewed: 09/01/2022  [employer] Patient Education © 2022 [employer]  DASH Eating Plan  DASH stands for Dietary Approaches to Stop Hypertension. The DASH eating plan is a healthy eating plan that has been shown to:  Reduce high blood pressure (hypertension).  Reduce your risk for type 2 diabetes, heart disease, and stroke.  Help with weight loss.  What are tips for following this plan?  Reading food labels  Check food labels for the amount of salt (sodium) per serving. Choose foods with less than 5 percent of the Daily Value of sodium. Generally, foods with less than 300 milligrams (mg) of sodium per serving fit into this eating plan.  To find whole grains, look for the word \"whole\" as the first word in the ingredient list.  Shopping  Buy products labeled as \"low-sodium\" or \"no salt added.\"  Buy fresh foods. Avoid canned foods and pre-made or frozen meals.  Cooking  Avoid adding salt when cooking. Use salt-free seasonings or herbs instead of table salt or sea salt. Check with your health care provider or pharmacist before using salt substitutes.  Do not wallis foods. Cook foods using healthy methods such as baking, boiling, grilling, roasting, and broiling instead.  Cook with heart-healthy oils, such as olive, canola, avocado, soybean, or sunflower oil.  Meal planning    Eat a balanced diet that " includes:  4 or more servings of fruits and 4 or more servings of vegetables each day. Try to fill one-half of your plate with fruits and vegetables.  6-8 servings of whole grains each day.  Less than 6 oz (170 g) of lean meat, poultry, or fish each day. A 3-oz (85-g) serving of meat is about the same size as a deck of cards. One egg equals 1 oz (28 g).  2-3 servings of low-fat dairy each day. One serving is 1 cup (237 mL).  1 serving of nuts, seeds, or beans 5 times each week.  2-3 servings of heart-healthy fats. Healthy fats called omega-3 fatty acids are found in foods such as walnuts, flaxseeds, fortified milks, and eggs. These fats are also found in cold-water fish, such as sardines, salmon, and mackerel.  Limit how much you eat of:  Canned or prepackaged foods.  Food that is high in trans fat, such as some fried foods.  Food that is high in saturated fat, such as fatty meat.  Desserts and other sweets, sugary drinks, and other foods with added sugar.  Full-fat dairy products.  Do not salt foods before eating.  Do not eat more than 4 egg yolks a week.  Try to eat at least 2 vegetarian meals a week.  Eat more home-cooked food and less restaurant, buffet, and fast food.  Lifestyle  When eating at a restaurant, ask that your food be prepared with less salt or no salt, if possible.  If you drink alcohol:  Limit how much you use to:  0-1 drink a day for women who are not pregnant.  0-2 drinks a day for men.  Be aware of how much alcohol is in your drink. In the [place] one drink equals one 12 oz bottle of beer (355 mL), one 5 oz glass of wine (148 mL), or one 1½ oz glass of hard liquor (44 mL).  General information  Avoid eating more than 2,300 mg of salt a day. If you have hypertension, you may need to reduce your sodium intake to 1,500 mg a day.  Work with your health care provider to maintain a healthy body weight or to lose weight. Ask what an ideal weight is for you.  Get at least 30 minutes of exercise that  causes your heart to beat faster (aerobic exercise) most days of the week. Activities may include walking, swimming, or biking.  Work with your health care provider or dietitian to adjust your eating plan to your individual calorie needs.  What foods should I eat?  Fruits  All fresh, dried, or frozen fruit. Canned fruit in natural juice (without added sugar).  Vegetables  Fresh or frozen vegetables (raw, steamed, roasted, or grilled). Low-sodium or reduced-sodium tomato and vegetable juice. Low-sodium or reduced-sodium tomato sauce and tomato paste. Low-sodium or reduced-sodium canned vegetables.  Grains  Whole-grain or whole-wheat bread. Whole-grain or whole-wheat pasta. Brown rice. Oatmeal. Quinoa. Bulgur. Whole-grain and low-sodium cereals. Lluvia bread. Low-fat, low-sodium crackers. Whole-wheat flour tortillas.  Meats and other proteins  Skinless chicken or turkey. Ground chicken or turkey. Pork with fat trimmed off. Fish and seafood. Egg whites. Dried beans, peas, or lentils. Unsalted nuts, nut butters, and seeds. Unsalted canned beans. Lean cuts of beef with fat trimmed off. Low-sodium, lean precooked or cured meat, such as sausages or meat loaves.  Dairy  Low-fat (1%) or fat-free (skim) milk. Reduced-fat, low-fat, or fat-free cheeses. Nonfat, low-sodium ricotta or cottage cheese. Low-fat or nonfat yogurt. Low-fat, low-sodium cheese.  Fats and oils  Soft margarine without trans fats. Vegetable oil. Reduced-fat, low-fat, or light mayonnaise and salad dressings (reduced-sodium). Canola, safflower, olive, avocado, soybean, and sunflower oils. Avocado.  Seasonings and condiments  Herbs. Spices. Seasoning mixes without salt.  Other foods  Unsalted popcorn and pretzels. Fat-free sweets.  The items listed above may not be a complete list of foods and beverages you can eat. Contact a dietitian for more information.  What foods should I avoid?  Fruits  Canned fruit in a light or heavy syrup. Fried fruit. Fruit in cream  or butter sauce.  Vegetables  Creamed or fried vegetables. Vegetables in a cheese sauce. Regular canned vegetables (not low-sodium or reduced-sodium). Regular canned tomato sauce and paste (not low-sodium or reduced-sodium). Regular tomato and vegetable juice (not low-sodium or reduced-sodium). Pickles. Olives.  Grains  Baked goods made with fat, such as croissants, muffins, or some breads. Dry pasta or rice meal packs.  Meats and other proteins  Fatty cuts of meat. Ribs. Fried meat. Clemente. Bologna, salami, and other precooked or cured meats, such as sausages or meat loaves. Fat from the back of a pig (fatback). Bratwurst. Salted nuts and seeds. Canned beans with added salt. Canned or smoked fish. Whole eggs or egg yolks. Chicken or turkey with skin.  Dairy  Whole or 2% milk, cream, and half-and-half. Whole or full-fat cream cheese. Whole-fat or sweetened yogurt. Full-fat cheese. Nondairy creamers. Whipped toppings. Processed cheese and cheese spreads.  Fats and oils  Butter. Stick margarine. Lard. Shortening. Ghee. Clemente fat. Tropical oils, such as coconut, palm kernel, or palm oil.  Seasonings and condiments  Onion salt, garlic salt, seasoned salt, table salt, and sea salt. [facility] [place] sauce. Tartar sauce. Barbecue sauce. Teriyaki sauce. Soy sauce, including reduced-sodium. Steak sauce. Canned and packaged gravies. Fish sauce. Oyster sauce. Cocktail sauce. Store-bought horseradish. Ketchup. Mustard. Meat flavorings and tenderizers. Bouillon cubes. Hot sauces. Pre-made or packaged marinades. Pre-made or packaged taco seasonings. Relishes. Regular salad dressings.  Other foods  Salted popcorn and pretzels.  The items listed above may not be a complete list of foods and beverages you should avoid. Contact a dietitian for more information.  Where to find more information  National Heart, Lung, and Blood [place]: www.nhlbi.nih.gov  American Heart Association: www.heart.org  Academy of Nutrition and Dietetics:  www.eatright.org  National Kidney Foundation: www.kidney.org  Summary  The DASH eating plan is a healthy eating plan that has been shown to reduce high blood pressure (hypertension). It may also reduce your risk for type 2 diabetes, heart disease, and stroke.  When on the DASH eating plan, aim to eat more fresh fruits and vegetables, whole grains, lean proteins, low-fat dairy, and heart-healthy fats.  With the DASH eating plan, you should limit salt (sodium) intake to 2,300 mg a day. If you have hypertension, you may need to reduce your sodium intake to 1,500 mg a day.  Work with your health care provider or dietitian to adjust your eating plan to your individual calorie needs.  This information is not intended to replace advice given to you by your health care provider. Make sure you discuss any questions you have with your health care provider.  Document Revised: 11/20/2020 Document Reviewed: 11/20/2020  [employer] Patient Education © 2022 [employer]

## 2023-03-27 NOTE — PROGRESS NOTES
Patient Care Team:  Jigar Schmitz APRN as PCP - General (Family Medicine)     Chief complaint: Patient is in today for a physical     Patient is a 42 y.o. male who presents for his yearly physical exam.     HPI   3/15/2023- Patient able complaints of worsening joint pain.  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  He states about 3 weeks ago he started feeling some joint pain in his hips, knees and elbows.  States he felt like he was run over by a truck.  He did have multiple days of chilling but only 1 day of fever.  This was low-grade.  He does take ibuprofen 800 mg generally in the morning and this helps his discomfort as well as his chilling and fever.  He generally takes Tylenol in the afternoon.  He reports he has not checked his blood pressure recently.    3/27/2023- Patient in for physical, Saw ID and getting IV medications, Taking doxy, feels is some better. Seeing them later today. Now on 1 dose Ibuprofen and feels much  better.  He also had some borderline hypothyroidism we will get the ultrasound soon as well as he has outstanding thyroid antibodies.    Review of Systems   Constitutional: Negative for appetite change, chills, fatigue and fever.   HENT: Negative for congestion, ear pain, hearing loss, rhinorrhea, sinus pressure and sore throat.    Eyes: Negative for itching and visual disturbance.   Respiratory: Negative for cough, shortness of breath and wheezing.    Cardiovascular: Negative for chest pain, palpitations and leg swelling.   Gastrointestinal: Negative for abdominal pain, blood in stool, constipation, diarrhea, nausea and vomiting.   Endocrine: Negative for cold intolerance, heat intolerance, polydipsia, polyphagia and polyuria.   Genitourinary: Negative for difficulty urinating, dysuria and hematuria.   Musculoskeletal: Positive for myalgias. Negative for arthralgias, back pain, joint swelling and neck pain.   Skin: Negative for rash and wound.   Allergic/Immunologic:  Negative for environmental allergies and food allergies.   Neurological: Negative for dizziness, light-headedness, numbness and headaches.   Hematological: Negative for adenopathy. Does not bruise/bleed easily.   Psychiatric/Behavioral: Negative for dysphoric mood and sleep disturbance. The patient is not nervous/anxious.       History  Past Medical History:   Diagnosis Date   • Obesity       Past Surgical History:   Procedure Laterality Date   • WISDOM TOOTH EXTRACTION        No Known Allergies   Family History   Problem Relation Age of Onset   • Arthritis Mother    • Hyperlipidemia Father    • Heart disease Maternal Grandfather    • Heart disease Paternal Grandfather      Social History     Socioeconomic History   • Marital status: Single   Tobacco Use   • Smoking status: Never     Passive exposure: Never   • Smokeless tobacco: Never   Vaping Use   • Vaping Use: Never used   Substance and Sexual Activity   • Alcohol use: Not Currently   • Drug use: Never   • Sexual activity: Yes     Partners: Female     Birth control/protection: Post-menopausal        Current Outpatient Medications:   •  ibuprofen (ADVIL,MOTRIN) 600 MG tablet, Take 1 tablet by mouth Every 6 (Six) Hours As Needed for Mild Pain., Disp: , Rfl:   •  vitamin D (ERGOCALCIFEROL) 1.25 MG (49495 UT) capsule capsule, Take 1 capsule by mouth 1 (One) Time Per Week., Disp: 12 capsule, Rfl: 0    Immunizations   N/A   Prescribed/Refused   Date     Td/Tdap  (Booster Q 10 yrs)   [x]           Prescribed    []     Refused        []           Flu  (Yearly)   [x]        Prescribed    []     Refused        []           Pneumonia      [x]        Prescribed    []     Refused        []                 Hep B     [x]        Prescribed    []     Refused        []           Shingles  (Age 50 and older)   [x]        Prescribed    []     Refused        []           Immunization History   Administered Date(s) Administered   • COVID-19 (PFIZER) PURPLE CAP 12/21/2020, 01/11/2021    • Hepatitis A 01/24/2020   • Tdap 01/22/2019     Health Maintenance   Topic Date Due   • COVID-19 Vaccine (3 - Booster for Pfizer series) 03/08/2021   • INFLUENZA VACCINE  Never done   • ANNUAL PHYSICAL  03/27/2024   • TDAP/TD VACCINES (2 - Td or Tdap) 01/22/2029   • HEPATITIS C SCREENING  Completed   • Pneumococcal Vaccine 0-64  Aged Out        Diabetes  [] Yes  [x] No   N/A      Date     Eye Exam     []             []   Complete     []   Recommended Date:  Where:       Foot Exam     []         []   Complete          Obesity Counseling     []       []   Complete     No results found for: HGBA1C, MICROALBUR    Additional Testing      Date     Colorectal Screening       [x]   N/A   []   Complete    []   Ordered     Date:    Where:       Pap      [x]   N/A   []   Complete   []   OB/GYN Date:    Where:       Mammogram        [x]   N/A   []   Complete   []  OB/GYN   []   Ordered Date:    Where:     PSA  (Over age 50)    [x]   N/A   []   Complete   []   Ordered Date:    Where:     US Aorta  (For male smokers, age 65)     [x]   N/A   []   Complete   []   Ordered Date:    Where:     CT for Smoker  (Age 55-75, 30 pk yr)    [x]   N/A   []   Complete   []   Ordered Date:    Where:     Bone Density/DEXA      [x]   N/A   []   Complete   []   Ordered Date:    Follow-up:     Hep. C        []   N/A   [x]   Complete   []   Ordered Date:    Where:     Results for orders placed or performed in visit on 03/24/23   Blood Culture - Blood, Arm, Right    Specimen: Arm, Right; Blood   Result Value Ref Range    Blood Culture No growth at 2 days    Blood Culture - Blood, Arm, Left    Specimen: Arm, Left; Blood   Result Value Ref Range    Blood Culture No growth at 2 days    TSH    Specimen: Blood   Result Value Ref Range    TSH 5.500 (H) 0.270 - 4.200 uIU/mL   T4, Free    Specimen: Blood   Result Value Ref Range    Free T4 0.95 0.93 - 1.70 ng/dL   T3, Free    Specimen: Blood   Result Value Ref Range    T3, Free 2.99 2.00 - 4.40 pg/mL  "  Antistreptolysin O (ASO) Screen    Specimen: Blood   Result Value Ref Range    ASO Negative Negative            /88   Pulse 78   Temp 98.4 °F (36.9 °C)   Resp 20   Ht 172.7 cm (67.99\")   Wt (!) 140 kg (308 lb 12.8 oz)   SpO2 100%   BMI 46.96 kg/m²       Physical Exam  Vitals and nursing note reviewed.   Constitutional:       General: He is not in acute distress.     Appearance: Normal appearance. He is well-developed. He is not diaphoretic.   HENT:      Head: Normocephalic and atraumatic.      Right Ear: External ear normal.      Left Ear: External ear normal.      Nose: Nose normal.   Eyes:      General: No scleral icterus.        Right eye: No discharge.         Left eye: No discharge.      Conjunctiva/sclera: Conjunctivae normal.      Pupils: Pupils are equal, round, and reactive to light.   Neck:      Thyroid: No thyromegaly.      Vascular: No JVD (no bruits).      Trachea: No tracheal deviation.   Cardiovascular:      Rate and Rhythm: Normal rate and regular rhythm.      Heart sounds: No murmur heard.    No friction rub. No gallop.   Pulmonary:      Effort: Pulmonary effort is normal. No respiratory distress.      Breath sounds: Normal breath sounds. No wheezing or rales.   Chest:      Chest wall: No tenderness.   Abdominal:      General: Bowel sounds are normal. There is no distension.      Palpations: Abdomen is soft. There is no mass.      Tenderness: There is no abdominal tenderness. There is no guarding or rebound.      Hernia: No hernia is present.   Genitourinary:     Comments: deferred  Musculoskeletal:         General: No tenderness or deformity. Normal range of motion.      Cervical back: Normal range of motion and neck supple.   Lymphadenopathy:      Cervical: No cervical adenopathy.   Skin:     General: Skin is warm and dry.      Coloration: Skin is not pale.      Findings: No erythema or rash.   Neurological:      Mental Status: He is alert and oriented to person, place, and time.    "   Motor: No abnormal muscle tone.      Deep Tendon Reflexes: Reflexes are normal and symmetric. Reflexes normal.   Psychiatric:         Behavior: Behavior normal.         Thought Content: Thought content normal.         Judgment: Judgment normal.             Counseling provided on weight management and hypertension.    Diagnoses and all orders for this visit:    Wellness examination    Primary hypertension    Class 3 severe obesity due to excess calories without serious comorbidity with body mass index (BMI) of 45.0 to 49.9 in adult (HCC)    Vitamin D deficiency    Acquired hypothyroidism    The preventative exam has been reviewed in detail.  Counseling/Anticipatory Guidance discussion included discussing nutrition needs, physical activity, healthy weight, injury prevention, misuse of tobacco, alcohol and drugs, sexual behavior, dental health, mental health, immunizations, and needed screenings has been discussed. The patient has been assisted with scheduling healthcare procedures for the coming year and given a written document outlining these recommendations. Age-appropriate screening measures have been ordered for the patient today as indicated above.    Still stage I high blood pressure.  He is going continue to work on diet and exercise to improve this without medication hopefully.  Patient instructed to check blood pressure daily, record, and bring to next visit. If the readings run 140/90 or greater, the patient is to call my office or return sooner for evaluation. Pt advised to eat a heart healthy diet and get regular aerobic exercise.    Discussed need for weight management.  I recommend they use a weight loss aayush on their phone, eat no more than [id number] rene/day, and exercise 30 to 60 minutes 3 times a week.  Discussed weight loss with diet, recommend Weight Watchers or Mediterranean Diet, but stated that whatever method works for this patient is best. The patient agrees with all recommendations at this  time. I have discussed a weight loss plan to be determined by this patient.     Will obtain routine labs today and make further recommendations pending results. All lab results are automatically released to [employer] immediately when they return whether they have been reviewed or not. The patient will be notified about the results, regardless of the findings. If they have not been contacted by the office within 2 weeks after the test has been performed, I want them to contact us to learn about the results.    We still have pending labs for possible hypothyroidism.  We also a pending ultrasound.  I will make further recommendations pending outcome of these test.    For vitamin D deficiency he is to take the ergocalciferol weekly for 12 weeks and then we will repeat a level and make a determination at that time whether he needs to be on over-the-counter or further prescription strength.    RV-  3-6 months      Jigar Schmitz, APRN   3/27/2023   11:43 EDT          Please note that portions of this document were completed with a voice recognition program.     At [facility], we believe that sharing information builds trust and better relationships. You are receiving this note because you are receiving care at [facility] or have recently visited. It is possible you will see health information before a provider has talked with you about it. This kind of information can be easy to misunderstand. To help you fully understand what it means for your health, we urge you to discuss this note with your provider.

## 2023-03-29 LAB
BACTERIA SPEC AEROBE CULT: NORMAL
BACTERIA SPEC AEROBE CULT: NORMAL

## 2023-03-31 ENCOUNTER — LAB (OUTPATIENT)
Dept: LAB | Facility: HOSPITAL | Age: 43
End: 2023-03-31
Payer: COMMERCIAL

## 2023-03-31 ENCOUNTER — TRANSCRIBE ORDERS (OUTPATIENT)
Dept: LAB | Facility: HOSPITAL | Age: 43
End: 2023-03-31
Payer: COMMERCIAL

## 2023-03-31 DIAGNOSIS — R79.82 ELEVATED C-REACTIVE PROTEIN (CRP): ICD-10-CM

## 2023-03-31 DIAGNOSIS — R70.0 ELEVATED SEDIMENTATION RATE: ICD-10-CM

## 2023-03-31 DIAGNOSIS — R74.01 NONSPECIFIC ELEVATION OF LEVELS OF TRANSAMINASE OR LACTIC ACID DEHYDROGENASE (LDH): ICD-10-CM

## 2023-03-31 DIAGNOSIS — A69.23 LYME ARTHRITIS: ICD-10-CM

## 2023-03-31 DIAGNOSIS — M77.9 ACUTE CALCIFIC PERIARTHRITIS: ICD-10-CM

## 2023-03-31 DIAGNOSIS — M77.9 ACUTE CALCIFIC PERIARTHRITIS: Primary | ICD-10-CM

## 2023-03-31 DIAGNOSIS — R74.02 NONSPECIFIC ELEVATION OF LEVELS OF TRANSAMINASE OR LACTIC ACID DEHYDROGENASE (LDH): ICD-10-CM

## 2023-03-31 DIAGNOSIS — I10 ESSENTIAL HYPERTENSION, MALIGNANT: ICD-10-CM

## 2023-03-31 DIAGNOSIS — E66.01 MORBID OBESITY: ICD-10-CM

## 2023-03-31 LAB
ALBUMIN SERPL-MCNC: 4 G/DL (ref 3.5–5.2)
ALBUMIN/GLOB SERPL: 1.4 G/DL
ALP SERPL-CCNC: 83 U/L (ref 39–117)
ALT SERPL W P-5'-P-CCNC: 46 U/L (ref 1–41)
ANION GAP SERPL CALCULATED.3IONS-SCNC: 11 MMOL/L (ref 5–15)
AST SERPL-CCNC: 16 U/L (ref 1–40)
BASOPHILS # BLD AUTO: 0.04 10*3/MM3 (ref 0–0.2)
BASOPHILS NFR BLD AUTO: 0.5 % (ref 0–1.5)
BILIRUB SERPL-MCNC: 0.4 MG/DL (ref 0–1.2)
BUN SERPL-MCNC: 16 MG/DL (ref 6–20)
BUN/CREAT SERPL: 15.8 (ref 7–25)
CALCIUM SPEC-SCNC: 9.3 MG/DL (ref 8.6–10.5)
CHLORIDE SERPL-SCNC: 102 MMOL/L (ref 98–107)
CO2 SERPL-SCNC: 29 MMOL/L (ref 22–29)
CREAT SERPL-MCNC: 1.01 MG/DL (ref 0.76–1.27)
DEPRECATED RDW RBC AUTO: 38.3 FL (ref 37–54)
EGFRCR SERPLBLD CKD-EPI 2021: 94.6 ML/MIN/1.73
EOSINOPHIL # BLD AUTO: 0.35 10*3/MM3 (ref 0–0.4)
EOSINOPHIL NFR BLD AUTO: 4.1 % (ref 0.3–6.2)
ERYTHROCYTE [DISTWIDTH] IN BLOOD BY AUTOMATED COUNT: 12.7 % (ref 12.3–15.4)
GLOBULIN UR ELPH-MCNC: 2.9 GM/DL
GLUCOSE SERPL-MCNC: 92 MG/DL (ref 65–99)
HCT VFR BLD AUTO: 42.5 % (ref 37.5–51)
HGB BLD-MCNC: 14.3 G/DL (ref 13–17.7)
IMM GRANULOCYTES # BLD AUTO: 0.05 10*3/MM3 (ref 0–0.05)
IMM GRANULOCYTES NFR BLD AUTO: 0.6 % (ref 0–0.5)
LYMPHOCYTES # BLD AUTO: 1.81 10*3/MM3 (ref 0.7–3.1)
LYMPHOCYTES NFR BLD AUTO: 21.3 % (ref 19.6–45.3)
MCH RBC QN AUTO: 28.7 PG (ref 26.6–33)
MCHC RBC AUTO-ENTMCNC: 33.6 G/DL (ref 31.5–35.7)
MCV RBC AUTO: 85.2 FL (ref 79–97)
MONOCYTES # BLD AUTO: 0.79 10*3/MM3 (ref 0.1–0.9)
MONOCYTES NFR BLD AUTO: 9.3 % (ref 5–12)
NEUTROPHILS NFR BLD AUTO: 5.44 10*3/MM3 (ref 1.7–7)
NEUTROPHILS NFR BLD AUTO: 64.2 % (ref 42.7–76)
NRBC BLD AUTO-RTO: 0 /100 WBC (ref 0–0.2)
PLATELET # BLD AUTO: 292 10*3/MM3 (ref 140–450)
PMV BLD AUTO: 9.3 FL (ref 6–12)
POTASSIUM SERPL-SCNC: 4.4 MMOL/L (ref 3.5–5.2)
PROT SERPL-MCNC: 6.9 G/DL (ref 6–8.5)
RBC # BLD AUTO: 4.99 10*6/MM3 (ref 4.14–5.8)
SODIUM SERPL-SCNC: 142 MMOL/L (ref 136–145)
WBC NRBC COR # BLD: 8.48 10*3/MM3 (ref 3.4–10.8)

## 2023-03-31 PROCEDURE — 36415 COLL VENOUS BLD VENIPUNCTURE: CPT

## 2023-03-31 PROCEDURE — 80053 COMPREHEN METABOLIC PANEL: CPT

## 2023-03-31 PROCEDURE — 85025 COMPLETE CBC W/AUTO DIFF WBC: CPT

## 2023-05-30 DIAGNOSIS — E55.9 VITAMIN D DEFICIENCY: Primary | ICD-10-CM

## 2023-05-30 DIAGNOSIS — E55.9 VITAMIN D DEFICIENCY: ICD-10-CM

## 2023-05-31 RX ORDER — ERGOCALCIFEROL 1.25 MG/1
CAPSULE ORAL
Qty: 12 CAPSULE | Refills: 0 | OUTPATIENT
Start: 2023-05-31

## 2023-05-31 NOTE — TELEPHONE ENCOUNTER
Pt called back informed he needs labs drawn first. States he works at [employer] [facility] [employer] and will get them drawn there.

## 2023-06-15 ENCOUNTER — LAB (OUTPATIENT)
Dept: LAB | Facility: HOSPITAL | Age: 43
End: 2023-06-15
Payer: COMMERCIAL

## 2023-06-15 DIAGNOSIS — E03.9 ACQUIRED HYPOTHYROIDISM: ICD-10-CM

## 2023-06-15 DIAGNOSIS — E55.9 VITAMIN D DEFICIENCY: ICD-10-CM

## 2023-06-15 PROCEDURE — 84443 ASSAY THYROID STIM HORMONE: CPT

## 2023-06-15 PROCEDURE — 82306 VITAMIN D 25 HYDROXY: CPT

## 2023-06-15 PROCEDURE — 84439 ASSAY OF FREE THYROXINE: CPT

## 2023-06-16 LAB
25(OH)D3 SERPL-MCNC: 23.6 NG/ML (ref 30–100)
T4 FREE SERPL-MCNC: 0.91 NG/DL (ref 0.93–1.7)
TSH SERPL DL<=0.05 MIU/L-ACNC: 2.89 UIU/ML (ref 0.27–4.2)

## 2023-06-21 PROBLEM — R79.82 ELEVATED C-REACTIVE PROTEIN (CRP): Status: ACTIVE | Noted: 2023-03-24

## 2023-06-21 PROBLEM — M77.8 ENTHESOPATHY OF ELBOW: Status: ACTIVE | Noted: 2023-03-24

## 2023-06-21 PROBLEM — A69.23 ARTHRITIS DUE TO LYME DISEASE: Status: ACTIVE | Noted: 2023-03-24

## 2023-06-21 PROBLEM — R74.01 ELEVATION OF LEVELS OF LIVER TRANSAMINASE LEVELS: Status: ACTIVE | Noted: 2023-03-24

## 2023-06-21 PROBLEM — R70.0 ELEVATED SED RATE: Status: ACTIVE | Noted: 2023-03-24

## 2023-09-06 ENCOUNTER — HOSPITAL ENCOUNTER (OUTPATIENT)
Dept: ULTRASOUND IMAGING | Facility: HOSPITAL | Age: 43
Discharge: HOME | End: 2023-09-06
Payer: COMMERCIAL

## 2023-09-11 DIAGNOSIS — E55.9 VITAMIN D DEFICIENCY: ICD-10-CM

## 2023-09-11 RX ORDER — ERGOCALCIFEROL 1.25 MG/1
50000 CAPSULE ORAL WEEKLY
Qty: 12 CAPSULE | Refills: 0 | Status: SHIPPED | OUTPATIENT
Start: 2023-09-11

## 2023-09-11 NOTE — TELEPHONE ENCOUNTER
Rx Refill Note  Requested Prescriptions     Pending Prescriptions Disp Refills    vitamin D (ERGOCALCIFEROL) 1.25 MG (09478 UT) capsule capsule 12 capsule 0     Sig: Take 1 capsule by mouth 1 (One) Time Per Week.      Last office visit with prescribing clinician: 3/27/2023   Last telemedicine visit with prescribing clinician: Visit date not found   Next office visit with prescribing clinician: Visit date not found     Raleigh Resendiz Rep  09/11/23, 10:06 EDT    Rx sent, was notified needs to have appointment before next refills

## 2023-09-20 ENCOUNTER — HOSPITAL ENCOUNTER (OUTPATIENT)
Dept: ULTRASOUND IMAGING | Facility: HOSPITAL | Age: 43
Discharge: HOME | End: 2023-09-20
Payer: COMMERCIAL

## 2023-09-20 DIAGNOSIS — E04.1 NONTOXIC SINGLE THYROID NODULE: ICD-10-CM

## 2023-09-20 PROCEDURE — 76942 ECHO GUIDE FOR BIOPSY: CPT

## 2023-09-20 RX ORDER — LIDOCAINE HYDROCHLORIDE 10 MG/ML
5 INJECTION, SOLUTION EPIDURAL; INFILTRATION; INTRACAUDAL; PERINEURAL ONCE
Status: DISCONTINUED | OUTPATIENT
Start: 2023-09-20 | End: 2023-09-21 | Stop reason: HOSPADM

## 2023-09-20 NOTE — H&P
[facility]   Vascular Interventional Radiology  History & Physicial        Patient Name:Benjamin Rivera    : 1980    MRN: [id number]    Primary Care Physician: Jigar Schmitz APRN    Referring Physician: Rodríguez Portillo MD     Date of admission: (Not on file)    Subjective     Reason for Consult: R thyroid FNA    History of Present Illness     Benjamin Rivera is a 43 y.o. male referred to IR as noted above.      Active Hospital Problems:  There are no active hospital problems to display for this patient.      Personal History     Past Medical History:   Diagnosis Date    Obesity        Past Surgical History:   Procedure Laterality Date    WISDOM TOOTH EXTRACTION         Family History: His family history includes Arthritis in his mother; Heart disease in his maternal grandfather and paternal grandfather; Hyperlipidemia in his father.     Social History: He  reports that he has never smoked. He has never been exposed to tobacco smoke. He has never used smokeless tobacco. He reports that he does not currently use alcohol. He reports that he does not use drugs.    Home Medications:  ibuprofen, levothyroxine, and vitamin D    Current Medications:  No current facility-administered medications for this encounter.     Allergies:  No Known Allergies    Review of Systems    IR Procedure pertinent significant findings are mentioned in the PMH and PSH above.    Objective     There were no vitals taken for this visit.     Physical Exam    A&Ox3.   Able to communicate  No Apparent Distress  Average physique   CVS: VS as noted. Chart reviewed. Stable for the procedure.  Respiratory: Non labored breathing. No signs of respiratory compromise.    Result Review      I have personally reviewed the results from the time of this admission to 2023 08:39 EDT and agree with these findings.  [x]  Laboratory  []  Microbiology  [x]  Radiology  []  EKG/Telemetry   []  Cardiology/Vascular   []  Pathology  []  Old  records  []  Other:    Most notable findings include: As noted:                      CrCl cannot be calculated (Patient's most recent lab result is older than the maximum 30 days allowed.). No results found for: CREATININE    No results found for: COVID19     No results found for: PREGTESTUR, PREGSERUM, HCG, HCGQUANT     ASA SCALE ASSESSMENT (applicable ONLY if sedation planned):        MALLAMPATI CLASSIFICATION (applicable ONLY if sedation planned):       Assessment / Plan     Benjamin Rivera is a 43 y.o. male referred to the IR service with above problem.    Plan:   As above.    Notice: The note was created before the performance of the procedure. It might have been left in the pending status and signed off after the procedure. The time stamp on the note may be misleading.    Andrew Saha MD   Vascular Interventional Radiology  09/20/23   8:39 AM EDT

## 2023-09-20 NOTE — PROCEDURES
The following procedure was performed: R thyroid FNA    Please see corresponding Radiology report for in detail procedural information. The Radiology report will be dictated shortly, if not done so already. Please see the IR RN note for the information regarding medicines administered if any, vinita-procedural vitals and I/O information.

## 2023-09-21 LAB — REF LAB TEST METHOD: NORMAL

## 2023-09-25 DIAGNOSIS — E03.9 ACQUIRED HYPOTHYROIDISM: ICD-10-CM

## 2023-09-25 RX ORDER — LEVOTHYROXINE SODIUM 0.03 MG/1
25 TABLET ORAL DAILY
Qty: 30 TABLET | Refills: 0 | Status: SHIPPED | OUTPATIENT
Start: 2023-09-25

## 2023-09-25 NOTE — TELEPHONE ENCOUNTER
[facility]  [address] [place] [address]    [phone]  F [phone]    RX # [id number]  LEVOTHYROXINE 0.025 MG (25MCG) TAB  QTY 30

## 2023-09-25 NOTE — TELEPHONE ENCOUNTER
Rx Refill Note  Requested Prescriptions     Pending Prescriptions Disp Refills    levothyroxine (Synthroid) 25 MCG tablet 30 tablet 5     Sig: Take 1 tablet by mouth Daily.      Last office visit with prescribing clinician: 3/27/2023   Last telemedicine visit with prescribing clinician: Visit date not found   Next office visit with prescribing clinician: Visit date not found                         Would you like a call back once the refill request has been completed: [] Yes [] No    If the office needs to give you a call back, can they leave a voicemail: [] Yes [] No    Tamanna Hernandez MA  09/25/23, 10:57 EDT    Patient was to   Return in about 6 months (around 9/27/2023), or if symptoms worsen or fail to improve, for Recheck.    Called and left  for patient to return call    OK FOR HUB TO RELAY MESSAGE AND SCHEDULE APPOINTMENT

## 2023-09-25 NOTE — TELEPHONE ENCOUNTER
READ HUB TO READ TO PATIENT. 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 HE STATED HE IS GOING OUT OF TOWN THIS THURSDAY.  PLEASE CALL HIM BACK.

## 2023-10-22 DIAGNOSIS — E03.9 ACQUIRED HYPOTHYROIDISM: ICD-10-CM

## 2023-10-23 RX ORDER — LEVOTHYROXINE SODIUM 0.03 MG/1
25 TABLET ORAL DAILY
Qty: 90 TABLET | Refills: 0 | Status: SHIPPED | OUTPATIENT
Start: 2023-10-23 | End: 2023-10-26 | Stop reason: SDUPTHER

## 2023-10-23 NOTE — TELEPHONE ENCOUNTER
Rx Refill Note  Requested Prescriptions     Pending Prescriptions Disp Refills    levothyroxine (SYNTHROID, LEVOTHROID) 25 MCG tablet [Pharmacy Med Name: LEVOTHYROXINE 0.025MG (25MCG) TAB] 30 tablet 0     Sig: TAKE 1 TABLET BY MOUTH DAILY      Last office visit with prescribing clinician: 3/27/2023   Last telemedicine visit with prescribing clinician: Visit date not found   Next office visit with prescribing clinician: Visit date not found                         Would you like a call back once the refill request has been completed: [] Yes [] No    If the office needs to give you a call back, can they leave a voicemail: [] Yes [] No    Caitlin Lozano MA  10/23/23, 08:49 EDT

## 2023-10-24 ENCOUNTER — OFFICE VISIT (OUTPATIENT)
Age: 43
End: 2023-10-24
Payer: COMMERCIAL

## 2023-10-24 ENCOUNTER — LAB (OUTPATIENT)
Age: 43
End: 2023-10-24
Payer: COMMERCIAL

## 2023-10-24 VITALS
BODY MASS INDEX: 45.92 KG/M2 | OXYGEN SATURATION: 99 % | HEIGHT: 68 IN | HEART RATE: 81 BPM | SYSTOLIC BLOOD PRESSURE: 124 MMHG | DIASTOLIC BLOOD PRESSURE: 82 MMHG | WEIGHT: 303 LBS

## 2023-10-24 DIAGNOSIS — E03.9 MYXEDEMA HEART DISEASE: Primary | ICD-10-CM

## 2023-10-24 DIAGNOSIS — M19.90 ARTHRITIS: ICD-10-CM

## 2023-10-24 DIAGNOSIS — E03.9 ACQUIRED HYPOTHYROIDISM: ICD-10-CM

## 2023-10-24 DIAGNOSIS — E55.9 VITAMIN D DEFICIENCY: ICD-10-CM

## 2023-10-24 DIAGNOSIS — I51.9 MYXEDEMA HEART DISEASE: Primary | ICD-10-CM

## 2023-10-24 DIAGNOSIS — E55.9 AVITAMINOSIS D: ICD-10-CM

## 2023-10-24 DIAGNOSIS — E66.01 CLASS 3 SEVERE OBESITY DUE TO EXCESS CALORIES WITHOUT SERIOUS COMORBIDITY WITH BODY MASS INDEX (BMI) OF 45.0 TO 49.9 IN ADULT: ICD-10-CM

## 2023-10-24 DIAGNOSIS — E03.9 ACQUIRED HYPOTHYROIDISM: Primary | ICD-10-CM

## 2023-10-24 PROBLEM — A69.23 ARTHRITIS DUE TO LYME DISEASE: Status: RESOLVED | Noted: 2023-03-24 | Resolved: 2023-10-24

## 2023-10-24 PROCEDURE — 84439 ASSAY OF FREE THYROXINE: CPT | Performed by: NURSE PRACTITIONER

## 2023-10-24 PROCEDURE — 82306 VITAMIN D 25 HYDROXY: CPT | Performed by: NURSE PRACTITIONER

## 2023-10-24 PROCEDURE — 36415 COLL VENOUS BLD VENIPUNCTURE: CPT | Performed by: NURSE PRACTITIONER

## 2023-10-24 PROCEDURE — 84443 ASSAY THYROID STIM HORMONE: CPT | Performed by: NURSE PRACTITIONER

## 2023-10-24 NOTE — PROGRESS NOTES
Chief Complaint   Patient presents with    Vitamin D Deficiency    Weight Check       Subjective   Benjamin Rivera is a 43 y.o. male    History of Present Illness  Patient today to follow-up on weight, vitamin D and hypothyroidism.  He did have some outstanding labs to have checked and he needs these done today.  He does report he takes his levothyroxine first thing in the morning. He does see ENT and having a biopsy.  His weight is down 5 pounds from last visit. Arthritis is better.     No Known Allergies  Past Medical History:   Diagnosis Date    Obesity       Past Surgical History:   Procedure Laterality Date    WISDOM TOOTH EXTRACTION       Social History     Socioeconomic History    Marital status:    Tobacco Use    Smoking status: Never     Passive exposure: Never    Smokeless tobacco: Never   Vaping Use    Vaping Use: Never used   Substance and Sexual Activity    Alcohol use: Not Currently    Drug use: Never    Sexual activity: Yes     Partners: Female     Birth control/protection: Post-menopausal        Current Outpatient Medications:     levothyroxine (SYNTHROID, LEVOTHROID) 25 MCG tablet, TAKE 1 TABLET BY MOUTH DAILY, Disp: 90 tablet, Rfl: 0    vitamin D (ERGOCALCIFEROL) 1.25 MG (36867 UT) capsule capsule, Take 1 capsule by mouth 1 (One) Time Per Week., Disp: 12 capsule, Rfl: 0     Review of Systems   Constitutional:  Negative for chills, fatigue, fever and unexpected weight change.   Respiratory:  Negative for cough, chest tightness, shortness of breath and wheezing.    Cardiovascular:  Negative for chest pain, palpitations and leg swelling.   Gastrointestinal:  Negative for constipation, diarrhea, nausea and vomiting.   Genitourinary:  Negative for difficulty urinating and dysuria.   Skin:  Negative for color change and rash.   Neurological:  Negative for dizziness, syncope, weakness and headaches.   Psychiatric/Behavioral:  Negative for sleep disturbance.        Objective     Vitals:     10/24/23 0919   BP: 124/82   Pulse: 81   SpO2: 99%         10/24/23  0919   Weight: (!) 137 kg (303 lb)     Body mass index is 46.08 kg/m².  Results for orders placed or performed during the hospital encounter of 23   [facility] ([facility])    Specimen: Body Fluid   Result Value Ref Range    Reference Lab Report       [facility]  [address] [place] [address]  Phone: [phone] or [phone]  Fax: [phone]  Nathen Maya M.D., Medical Director    PATIENT NAME                                 LABORATORY NO.  ROSA ISELA MEDRANO.                          [id number]  [id number]                                     AGE                SEX     SSN            CLIENT REF #  [facility]                       43       1980          [id number]    [id number]  [address] [facility]                          REQUESTING M.D.       ATTENDING M.D..        [place] [address]                            BASSAM HARPER  DATE COLLECTED        DATE RECEIVED          DATE REPORTED  2023    DIAGNOSIS:  THYROID, FNA, RIGHT:  Nondiagnostic specimen    MICROSCOPIC DESCRIPTION:  There is mostly blood with less than six follicular groups present.  If clinically  indicated, additional  sampling is recommended (TBS Class I).    Professional interpretation rendered by Du Garg M.D., F.C.A.P. at [employer] [employer], [address] [place] [address].    CLINICAL HISTORY:  Nontoxic single thyroid nodule    SPECIMENS SUBMITTED:  THYROID, FNA, RIGHT    GROSS SPECIMEN DESCRIPTION:  30cc of pale pink, translucent fluid in fixative    REVIEWED, DIAGNOSED AND ELECTRONICALLY  SIGNED BY:    Du Garg M.D., F.C.A.P.  CPT CODES:  91662         Physical Exam  Vitals reviewed.   Constitutional:       Appearance: He is well-developed.   HENT:      Head: Normocephalic and atraumatic.    Cardiovascular:      Rate and Rhythm: Normal rate and regular rhythm.      Heart sounds: Normal heart sounds.   Pulmonary:      Effort: Pulmonary effort is normal.      Breath sounds: Normal breath sounds.   Skin:     General: Skin is warm and dry.   Neurological:      Mental Status: He is alert and oriented to person, place, and time.   Psychiatric:         Behavior: Behavior normal.         Assessment & Plan   Problems Addressed this Visit          Endocrine and Metabolic    Acquired hypothyroidism - Primary    Class 3 severe obesity due to excess calories without serious comorbidity with body mass index (BMI) of 45.0 to 49.9 in adult    Vitamin D deficiency       Musculoskeletal and Injuries    Arthritis     Diagnoses         Codes Comments    Acquired hypothyroidism    -  Primary ICD-10-CM: E03.9  ICD-9-CM: 244.9     Class 3 severe obesity due to excess calories without serious comorbidity with body mass index (BMI) of 45.0 to 49.9 in adult     ICD-10-CM: E66.01, Z68.42  ICD-9-CM: 278.01, V85.42     Vitamin D deficiency     ICD-10-CM: E55.9  ICD-9-CM: 268.9     Arthritis     ICD-10-CM: M19.90  ICD-9-CM: 716.90         We will go ahead and check thyroid labs as well as vitamin D today.  We will make further recommendations pending outcome of these tests.  He will start vitamin D 2000 international units daily.    I did discussed the importance of him losing a significant amount of weight.  We did discuss medication such as we go V but he would not like to do this at this time.  I have recommended recording what he puts in his mouth to help with calorie control. Discussed need for weight management.  I recommend they use a weight loss aayush on their phone, eat no more than [id number] rene/day, and exercise 30 to 60 minutes 3 times a week.  Discussed weight loss with diet, recommend Weight Watchers or Mediterranean Diet, but stated that whatever method works for this patient is best. The patient agrees with all  recommendations at this time. I have discussed a weight loss plan to be determined by this patient.     His arthritis pain has totally resolved now.    Time spent on visit, including counseling, education, reviewing the chart, and any recent test results, was    15    Minutes    Return visit in 5 months for physical    Counseling provided on weight management and hypothyroidism .    Jigar Lowry Ainsley, APRN   10/24/2023   09:44 EDT     Please note that portions of this document were completed with a voice recognition program.     At [facility], we believe that sharing information builds trust and better relationships. You are receiving this note because you are receiving care at [facility] or have recently visited. It is possible you will see health information before a provider has talked with you about it. This kind of information can be easy to misunderstand. To help you fully understand what it means for your health, we urge you to discuss this note with your provider.

## 2023-10-24 NOTE — PATIENT INSTRUCTIONS
Obesity, Adult  Obesity is the condition of having too much total body fat. Being overweight or obese means that your weight is greater than what is considered healthy for your body size. Obesity is determined by a measurement called BMI (body mass index). BMI is an estimate of body fat and is calculated from height and weight. For adults, a BMI of 30 or higher is considered obese.  Obesity can lead to other health concerns and major illnesses, including:  Stroke.  Coronary artery disease (CAD).  Type 2 diabetes.  Some types of cancer, including cancers of the colon, breast, uterus, and gallbladder.  High blood pressure (hypertension).  High cholesterol.  Gallbladder stones.  Obesity can also contribute to:  Osteoarthritis.  Sleep apnea.  Infertility problems.  What are the causes?  Common causes of this condition include:  Eating daily meals that are high in calories, sugar, and fat.  Drinking high amounts of sugar-sweetened beverages, such as soft drinks.  Being born with genes that may make you more likely to become obese.  Having a medical condition that causes obesity, including:  Hypothyroidism.  Polycystic ovarian syndrome (PCOS).  Binge-eating disorder.  Cushing syndrome.  Taking certain medicines, such as steroids, antidepressants, and seizure medicines.  Not being physically active (sedentary lifestyle).  Not getting enough sleep.  What increases the risk?  The following factors may make you more likely to develop this condition:  Having a family history of obesity.  Living in an area with limited access to:  Neves, recreation centers, or sidewalks.  Healthy food choices, such as grocery stores and [employer] markets.  What are the signs or symptoms?  The main sign of this condition is having too much body fat.  How is this diagnosed?  This condition is diagnosed based on:  Your BMI. If you are an adult with a BMI of 30 or higher, you are considered obese.  Your waist circumference. This measures the  distance around your waistline.  Your skinfold thickness. Your health care provider may gently pinch a fold of your skin and measure it.  You may have other tests to check for underlying conditions.  How is this treated?  Treatment for this condition often includes changing your lifestyle. Treatment may include some or all of the following:  Dietary changes. This may include developing a healthy meal plan.  Regular physical activity. This may include activity that causes your heart to beat faster (aerobic exercise) and strength training. Work with your health care provider to design an exercise program that works for you.  Medicine to help you lose weight if you are unable to lose one pound a week after six weeks of healthy eating and more physical activity.  Treating conditions that cause the obesity (underlying conditions).  Surgery. Surgical options may include gastric banding and gastric bypass. Surgery may be done if:  Other treatments have not helped to improve your condition.  You have a BMI of 40 or higher.  You have life-threatening health problems related to obesity.  Follow these instructions at home:  Eating and drinking    Follow recommendations from your health care provider about what you eat and drink. Your health care provider may advise you to:  Limit fast food, sweets, and processed snack foods.  Choose low-fat options, such as low-fat milk instead of whole milk.  Eat five or more servings of fruits or vegetables every day.  Choose healthy foods when you eat out.  Keep low-fat snacks available.  Limit sugary drinks, such as soda, fruit juice, sweetened iced tea, and flavored milk.  Drink enough water to keep your urine pale yellow.  Do not follow a fad diet. Fad diets can be unhealthy and even dangerous.  Other healthful choices include:  Eat at home more often. This gives you more control over what you eat.  Learn to read food labels. This will help you understand how much food is considered one  serving.  Learn what a healthy serving size is.  Physical activity  Exercise regularly, as told by your health care provider.  Most adults should get up to 150 minutes of moderate-intensity exercise every week.  Ask your health care provider what types of exercise are safe for you and how often you should exercise.  Warm up and stretch before being active.  Cool down and stretch after being active.  Rest between periods of activity.  Lifestyle  Work with your health care provider and a dietitian to set a weight-loss goal that is healthy and reasonable for you.  Limit your screen time.  Find ways to reward yourself that do not involve food.  Do not drink alcohol if:  Your health care provider tells you not to drink.  You are pregnant, may be pregnant, or are planning to become pregnant.  If you drink alcohol:  Limit how much you have to:  0-1 drink a day for women.  0-2 drinks a day for men.  Know how much alcohol is in your drink. In the [place] one drink equals one 12 oz bottle of beer (355 mL), one 5 oz glass of wine (148 mL), or one 1½ oz glass of hard liquor (44 mL).  General instructions  Keep a weight-loss journal to keep track of the food you eat and how much exercise you get.  Take over-the-counter and prescription medicines only as told by your health care provider.  Take vitamins and supplements only as told by your health care provider.  Consider joining a support group. Your health care provider may be able to recommend a support group.  Pay attention to your mental health as obesity can lead to depression or self esteem issues.  Keep all follow-up visits. This is important.  Contact a health care provider if:  You are unable to meet your weight-loss goal after six weeks of dietary and lifestyle changes.  You have trouble breathing.  Summary  Obesity is the condition of having too much total body fat.  Being overweight or obese means that your weight is greater than what is considered healthy for your body  size.  Work with your health care provider and a dietitian to set a weight-loss goal that is healthy and reasonable for you.  Exercise regularly, as told by your health care provider. Ask your health care provider what types of exercise are safe for you and how often you should exercise.  This information is not intended to replace advice given to you by your health care provider. Make sure you discuss any questions you have with your health care provider.  Document Revised: 07/26/2022 Document Reviewed: 07/26/2022  Elsevier Patient Education © 2023 Elsevier Inc.

## 2023-10-25 LAB
25(OH)D3 SERPL-MCNC: 30.7 NG/ML (ref 30–100)
T4 FREE SERPL-MCNC: 0.85 NG/DL (ref 0.93–1.7)
TSH SERPL DL<=0.05 MIU/L-ACNC: 5.15 UIU/ML (ref 0.27–4.2)

## 2023-10-26 DIAGNOSIS — E03.9 ACQUIRED HYPOTHYROIDISM: ICD-10-CM

## 2023-10-26 RX ORDER — LEVOTHYROXINE SODIUM 0.05 MG/1
50 TABLET ORAL DAILY
Qty: 90 TABLET | Refills: 1 | Status: SHIPPED | OUTPATIENT
Start: 2023-10-26

## 2023-12-04 DIAGNOSIS — E55.9 VITAMIN D DEFICIENCY: ICD-10-CM

## 2023-12-04 RX ORDER — ERGOCALCIFEROL 1.25 MG/1
50000 CAPSULE ORAL WEEKLY
Qty: 12 CAPSULE | Refills: 0 | Status: SHIPPED | OUTPATIENT
Start: 2023-12-04

## 2023-12-04 NOTE — TELEPHONE ENCOUNTER
Rx Refill Note  Requested Prescriptions     Pending Prescriptions Disp Refills    vitamin D (ERGOCALCIFEROL) 1.25 MG (39162 UT) capsule capsule [Pharmacy Med Name: VITAMIN D2 50,000IU (ERGO) CAP RX] 12 capsule 0     Sig: TAKE 1 CAPSULE BY MOUTH 1 TIME EVERY WEEK      Last office visit with prescribing clinician: 10/24/2023   Last telemedicine visit with prescribing clinician: Visit date not found   Next office visit with prescribing clinician: Visit date not found     Raleigh Resendiz Rep  12/04/23, 08:16 EST  Rx sent

## 2024-05-03 ENCOUNTER — LAB (OUTPATIENT)
Dept: LAB | Facility: HOSPITAL | Age: 44
End: 2024-05-03
Payer: COMMERCIAL

## 2024-05-03 DIAGNOSIS — E03.9 ACQUIRED HYPOTHYROIDISM: ICD-10-CM

## 2024-05-03 LAB
T4 FREE SERPL-MCNC: 0.88 NG/DL (ref 0.93–1.7)
TSH SERPL DL<=0.05 MIU/L-ACNC: 3.33 UIU/ML (ref 0.27–4.2)

## 2024-05-03 PROCEDURE — 36415 COLL VENOUS BLD VENIPUNCTURE: CPT

## 2024-05-03 PROCEDURE — 84439 ASSAY OF FREE THYROXINE: CPT

## 2024-05-03 PROCEDURE — 84443 ASSAY THYROID STIM HORMONE: CPT

## 2024-05-06 DIAGNOSIS — E03.9 ACQUIRED HYPOTHYROIDISM: Primary | ICD-10-CM

## 2024-05-06 RX ORDER — LEVOTHYROXINE SODIUM 0.07 MG/1
75 TABLET ORAL DAILY
Qty: 90 TABLET | Refills: 1 | Status: SHIPPED | OUTPATIENT
Start: 2024-05-06

## 2024-05-06 NOTE — PROGRESS NOTES
His circulating thyroid is still a little low.  We are going to increase his levothyroxine 2.75 mg daily and make sure he is taking this first thing in the morning on empty stomach.  I will repeat the thyroid level in 1 month.  All this has been ordered.

## 2024-06-03 ENCOUNTER — LAB (OUTPATIENT)
Dept: LAB | Facility: HOSPITAL | Age: 44
End: 2024-06-03
Payer: COMMERCIAL

## 2024-06-03 DIAGNOSIS — E03.9 ACQUIRED HYPOTHYROIDISM: ICD-10-CM

## 2024-06-03 LAB
T4 FREE SERPL-MCNC: 0.95 NG/DL (ref 0.92–1.68)
TSH SERPL DL<=0.05 MIU/L-ACNC: 3.14 UIU/ML (ref 0.27–4.2)

## 2024-06-03 PROCEDURE — 84439 ASSAY OF FREE THYROXINE: CPT

## 2024-06-03 PROCEDURE — 84443 ASSAY THYROID STIM HORMONE: CPT

## 2024-06-03 PROCEDURE — 36415 COLL VENOUS BLD VENIPUNCTURE: CPT

## 2024-09-24 ENCOUNTER — TELEPHONE (OUTPATIENT)
Age: 44
End: 2024-09-24
Payer: COMMERCIAL

## 2024-09-26 ENCOUNTER — LAB (OUTPATIENT)
Age: 44
End: 2024-09-26
Payer: COMMERCIAL

## 2024-09-26 ENCOUNTER — OFFICE VISIT (OUTPATIENT)
Age: 44
End: 2024-09-26
Payer: COMMERCIAL

## 2024-09-26 VITALS
HEART RATE: 80 BPM | SYSTOLIC BLOOD PRESSURE: 122 MMHG | HEIGHT: 67 IN | WEIGHT: 299.7 LBS | OXYGEN SATURATION: 99 % | BODY MASS INDEX: 47.04 KG/M2 | DIASTOLIC BLOOD PRESSURE: 82 MMHG

## 2024-09-26 DIAGNOSIS — E66.01 CLASS 3 SEVERE OBESITY DUE TO EXCESS CALORIES WITHOUT SERIOUS COMORBIDITY WITH BODY MASS INDEX (BMI) OF 45.0 TO 49.9 IN ADULT: ICD-10-CM

## 2024-09-26 DIAGNOSIS — Z13.0 SCREENING FOR DEFICIENCY ANEMIA: ICD-10-CM

## 2024-09-26 DIAGNOSIS — E55.9 VITAMIN D DEFICIENCY: ICD-10-CM

## 2024-09-26 DIAGNOSIS — Z13.29 SCREENING FOR THYROID DISORDER: ICD-10-CM

## 2024-09-26 DIAGNOSIS — E03.9 ACQUIRED HYPOTHYROIDISM: ICD-10-CM

## 2024-09-26 DIAGNOSIS — Z00.00 WELLNESS EXAMINATION: ICD-10-CM

## 2024-09-26 DIAGNOSIS — Z13.220 SCREENING FOR LIPID DISORDERS: ICD-10-CM

## 2024-09-26 DIAGNOSIS — Z00.00 WELLNESS EXAMINATION: Primary | ICD-10-CM

## 2024-09-26 DIAGNOSIS — Z13.1 SCREENING FOR DIABETES MELLITUS: ICD-10-CM

## 2024-09-26 LAB
25(OH)D3 SERPL-MCNC: 25.9 NG/ML (ref 30–100)
ALBUMIN SERPL-MCNC: 4.3 G/DL (ref 3.5–5.2)
ALBUMIN/GLOB SERPL: 1.7 G/DL
ALP SERPL-CCNC: 73 U/L (ref 39–117)
ALT SERPL W P-5'-P-CCNC: 41 U/L (ref 1–41)
ANION GAP SERPL CALCULATED.3IONS-SCNC: 11 MMOL/L (ref 5–15)
ARTICHOKE IGE QN: 90 MG/DL (ref 0–100)
AST SERPL-CCNC: 25 U/L (ref 1–40)
BILIRUB SERPL-MCNC: 0.3 MG/DL (ref 0–1.2)
BUN SERPL-MCNC: 14 MG/DL (ref 6–20)
BUN/CREAT SERPL: 15.1 (ref 7–25)
CALCIUM SPEC-SCNC: 9.6 MG/DL (ref 8.6–10.5)
CHLORIDE SERPL-SCNC: 102 MMOL/L (ref 98–107)
CHOLEST SERPL-MCNC: 287 MG/DL (ref 0–200)
CO2 SERPL-SCNC: 25 MMOL/L (ref 22–29)
CREAT SERPL-MCNC: 0.93 MG/DL (ref 0.76–1.27)
DEPRECATED RDW RBC AUTO: 42.6 FL (ref 37–54)
EGFRCR SERPLBLD CKD-EPI 2021: 103.2 ML/MIN/1.73
ERYTHROCYTE [DISTWIDTH] IN BLOOD BY AUTOMATED COUNT: 13.1 % (ref 12.3–15.4)
GLOBULIN UR ELPH-MCNC: 2.5 GM/DL
GLUCOSE SERPL-MCNC: 96 MG/DL (ref 65–99)
HCT VFR BLD AUTO: 46 % (ref 37.5–51)
HDLC SERPL-MCNC: 43 MG/DL (ref 40–60)
HGB BLD-MCNC: 16.3 G/DL (ref 13–17.7)
LDLC SERPL CALC-MCNC: ABNORMAL MG/DL
LDLC/HDLC SERPL: ABNORMAL {RATIO}
MCH RBC QN AUTO: 31.9 PG (ref 26.6–33)
MCHC RBC AUTO-ENTMCNC: 35.4 G/DL (ref 31.5–35.7)
MCV RBC AUTO: 90 FL (ref 79–97)
PLATELET # BLD AUTO: 217 10*3/MM3 (ref 140–450)
PMV BLD AUTO: 10.1 FL (ref 6–12)
POTASSIUM SERPL-SCNC: 4 MMOL/L (ref 3.5–5.2)
PROT SERPL-MCNC: 6.8 G/DL (ref 6–8.5)
RBC # BLD AUTO: 5.11 10*6/MM3 (ref 4.14–5.8)
SODIUM SERPL-SCNC: 138 MMOL/L (ref 136–145)
T4 FREE SERPL-MCNC: 0.79 NG/DL (ref 0.92–1.68)
TRIGL SERPL-MCNC: 1231 MG/DL (ref 0–150)
TSH SERPL DL<=0.05 MIU/L-ACNC: 4.69 UIU/ML (ref 0.27–4.2)
VLDLC SERPL-MCNC: ABNORMAL MG/DL
WBC NRBC COR # BLD AUTO: 7.26 10*3/MM3 (ref 3.4–10.8)

## 2024-09-26 PROCEDURE — 82306 VITAMIN D 25 HYDROXY: CPT | Performed by: NURSE PRACTITIONER

## 2024-09-26 PROCEDURE — 80050 GENERAL HEALTH PANEL: CPT | Performed by: NURSE PRACTITIONER

## 2024-09-26 PROCEDURE — 99396 PREV VISIT EST AGE 40-64: CPT | Performed by: NURSE PRACTITIONER

## 2024-09-26 PROCEDURE — 84439 ASSAY OF FREE THYROXINE: CPT | Performed by: NURSE PRACTITIONER

## 2024-09-26 PROCEDURE — 83721 ASSAY OF BLOOD LIPOPROTEIN: CPT | Performed by: NURSE PRACTITIONER

## 2024-09-26 PROCEDURE — 36415 COLL VENOUS BLD VENIPUNCTURE: CPT | Performed by: NURSE PRACTITIONER

## 2024-09-26 PROCEDURE — 80061 LIPID PANEL: CPT | Performed by: NURSE PRACTITIONER

## 2024-09-26 RX ORDER — LEVOTHYROXINE SODIUM 75 UG/1
75 TABLET ORAL DAILY
Qty: 90 TABLET | Refills: 3 | Status: SHIPPED | OUTPATIENT
Start: 2024-09-26

## 2024-09-26 RX ORDER — LEVOTHYROXINE SODIUM 75 UG/1
75 TABLET ORAL DAILY
Qty: 90 TABLET | Refills: 1 | Status: SHIPPED | OUTPATIENT
Start: 2024-09-26 | End: 2024-09-26 | Stop reason: SDUPTHER

## 2024-09-30 ENCOUNTER — TELEPHONE (OUTPATIENT)
Age: 44
End: 2024-09-30
Payer: COMMERCIAL

## 2024-09-30 DIAGNOSIS — E03.9 ACQUIRED HYPOTHYROIDISM: ICD-10-CM

## 2024-09-30 RX ORDER — LEVOTHYROXINE SODIUM 75 UG/1
75 TABLET ORAL DAILY
Qty: 90 TABLET | Refills: 1 | OUTPATIENT
Start: 2024-09-30

## 2024-09-30 NOTE — TELEPHONE ENCOUNTER
Relay    ----- Message from Jigar Schmitz sent at 9/27/2024  3:07 PM EDT -----  This patient needs a visit to discuss his cholesterol panel and thyroid labs.

## 2024-09-30 NOTE — LETTER
October 3, 2024      Benjamin Dieudonne Rivera    [address] [facility] [address]        Dear Mr. Rivera    Below are the results from your recent visit:    Lab on 09/26/2024   Component Date Value Ref Range Status   • Glucose 09/26/2024 96  65 - 99 mg/dL Final   • BUN 09/26/2024 14  6 - 20 mg/dL Final   • Creatinine 09/26/2024 0.93  0.76 - 1.27 mg/dL Final   • Sodium 09/26/2024 138  136 - 145 mmol/L Final   • Potassium 09/26/2024 4.0  3.5 - 5.2 mmol/L Final   • Chloride 09/26/2024 102  98 - 107 mmol/L Final   • CO2 09/26/2024 25.0  22.0 - 29.0 mmol/L Final   • Calcium 09/26/2024 9.6  8.6 - 10.5 mg/dL Final   • Total Protein 09/26/2024 6.8  6.0 - 8.5 g/dL Final   • Albumin 09/26/2024 4.3  3.5 - 5.2 g/dL Final   • ALT (SGPT) 09/26/2024 41  1 - 41 U/L Final   • AST (SGOT) 09/26/2024 25  1 - 40 U/L Final   • Alkaline Phosphatase 09/26/2024 73  39 - 117 U/L Final   • Total Bilirubin 09/26/2024 0.3  0.0 - 1.2 mg/dL Final   • Globulin 09/26/2024 2.5  gm/dL Final   • A/G Ratio 09/26/2024 1.7  g/dL Final   • BUN/Creatinine Ratio 09/26/2024 15.1  7.0 - 25.0 Final   • Anion Gap 09/26/2024 11.0  5.0 - 15.0 mmol/L Final   • eGFR 09/26/2024 103.2  >60.0 mL/min/1.73 Final   • Total Cholesterol 09/26/2024 287 (H)  0 - 200 mg/dL Final   • Triglycerides 09/26/2024 1,231 (H)  0 - 150 mg/dL Final   • HDL Cholesterol 09/26/2024 43  40 - 60 mg/dL Final    Triglyceride result >1200 mg/dL. HDL result may be affected.  Calculated results will not be reported.    • LDL Cholesterol  09/26/2024    Final    Unable to calculate   • VLDL Cholesterol 09/26/2024    Final    Unable to calculate   • LDL/HDL Ratio 09/26/2024    Final    Unable to calculate   • 25 Hydroxy, Vitamin D 09/26/2024 25.9 (L)  30.0 - 100.0 ng/ml Final   • TSH 09/26/2024 4.690 (H)  0.270 - 4.200 uIU/mL Final   • Free T4 09/26/2024 0.79 (L)  0.92 - 1.68 ng/dL Final   • WBC 09/26/2024 7.26  3.40 - 10.80 10*3/mm3 Final   • RBC 09/26/2024 5.11  4.14 - 5.80 10*6/mm3 Final   •  Hemoglobin 09/26/2024 16.3  13.0 - 17.7 g/dL Final   • Hematocrit 09/26/2024 46.0  37.5 - 51.0 % Final   • MCV 09/26/2024 90.0  79.0 - 97.0 fL Final   • MCH 09/26/2024 31.9  26.6 - 33.0 pg Final   • MCHC 09/26/2024 35.4  31.5 - 35.7 g/dL Final   • RDW 09/26/2024 13.1  12.3 - 15.4 % Final   • RDW-SD 09/26/2024 42.6  37.0 - 54.0 fl Final   • MPV 09/26/2024 10.1  6.0 - 12.0 fL Final   • Platelets 09/26/2024 217  140 - 450 10*3/mm3 Final   • LDL Cholesterol  09/26/2024 90  0 - 100 mg/dL Final      Benjamin,   Please call pur office to schedule an office visit to discuss your cholesterol panel and your thyroid labs      Sincerely,      MARGO Crowell

## 2024-10-01 DIAGNOSIS — E03.9 ACQUIRED HYPOTHYROIDISM: ICD-10-CM

## 2024-10-01 RX ORDER — LEVOTHYROXINE SODIUM 75 UG/1
75 TABLET ORAL DAILY
Qty: 90 TABLET | Refills: 0 | Status: SHIPPED | OUTPATIENT
Start: 2024-10-01

## 2024-10-03 NOTE — TELEPHONE ENCOUNTER
Relay    ----- Message from Jigar Schmitz sent at 9/27/2024  3:07 PM EDT -----  This patient needs a visit to discuss his cholesterol panel and thyroid labs.    [employer], SENT LETTER

## 2025-01-03 ENCOUNTER — LAB (OUTPATIENT)
Age: 45
End: 2025-01-03
Payer: COMMERCIAL

## 2025-01-03 ENCOUNTER — OFFICE VISIT (OUTPATIENT)
Age: 45
End: 2025-01-03
Payer: COMMERCIAL

## 2025-01-03 VITALS
WEIGHT: 305 LBS | OXYGEN SATURATION: 99 % | BODY MASS INDEX: 47.87 KG/M2 | HEIGHT: 67 IN | HEART RATE: 90 BPM | DIASTOLIC BLOOD PRESSURE: 86 MMHG | SYSTOLIC BLOOD PRESSURE: 124 MMHG | RESPIRATION RATE: 18 BRPM

## 2025-01-03 DIAGNOSIS — E03.9 ACQUIRED HYPOTHYROIDISM: ICD-10-CM

## 2025-01-03 DIAGNOSIS — E78.2 ELEVATED TRIGLYCERIDES WITH HIGH CHOLESTEROL: ICD-10-CM

## 2025-01-03 DIAGNOSIS — E66.813 CLASS 3 SEVERE OBESITY DUE TO EXCESS CALORIES WITHOUT SERIOUS COMORBIDITY WITH BODY MASS INDEX (BMI) OF 45.0 TO 49.9 IN ADULT: ICD-10-CM

## 2025-01-03 DIAGNOSIS — E03.9 ACQUIRED HYPOTHYROIDISM: Primary | ICD-10-CM

## 2025-01-03 DIAGNOSIS — E66.01 CLASS 3 SEVERE OBESITY DUE TO EXCESS CALORIES WITHOUT SERIOUS COMORBIDITY WITH BODY MASS INDEX (BMI) OF 45.0 TO 49.9 IN ADULT: ICD-10-CM

## 2025-01-03 LAB
ARTICHOKE IGE QN: 74 MG/DL (ref 0–100)
CHOLEST SERPL-MCNC: 253 MG/DL (ref 0–200)
HDLC SERPL-MCNC: 39 MG/DL (ref 40–60)
LDLC SERPL CALC-MCNC: ABNORMAL MG/DL
LDLC/HDLC SERPL: ABNORMAL {RATIO}
T4 FREE SERPL-MCNC: 0.98 NG/DL (ref 0.92–1.68)
TRIGL SERPL-MCNC: 1149 MG/DL (ref 0–150)
TSH SERPL DL<=0.05 MIU/L-ACNC: 4.5 UIU/ML (ref 0.27–4.2)
VLDLC SERPL-MCNC: ABNORMAL MG/DL

## 2025-01-03 PROCEDURE — 83721 ASSAY OF BLOOD LIPOPROTEIN: CPT | Performed by: NURSE PRACTITIONER

## 2025-01-03 PROCEDURE — 84443 ASSAY THYROID STIM HORMONE: CPT | Performed by: NURSE PRACTITIONER

## 2025-01-03 PROCEDURE — 80061 LIPID PANEL: CPT | Performed by: NURSE PRACTITIONER

## 2025-01-03 PROCEDURE — 84439 ASSAY OF FREE THYROXINE: CPT | Performed by: NURSE PRACTITIONER

## 2025-01-03 PROCEDURE — 36415 COLL VENOUS BLD VENIPUNCTURE: CPT | Performed by: NURSE PRACTITIONER

## 2025-01-03 PROCEDURE — 99214 OFFICE O/P EST MOD 30 MIN: CPT | Performed by: NURSE PRACTITIONER

## 2025-01-03 RX ORDER — LEVOTHYROXINE SODIUM 88 UG/1
88 TABLET ORAL DAILY
Qty: 90 TABLET | Refills: 1 | Status: SHIPPED | OUTPATIENT
Start: 2025-01-03

## 2025-01-03 NOTE — PATIENT INSTRUCTIONS
Obesity, Adult  Obesity is the condition of having too much total body fat. Being overweight or obese means that your weight is greater than what is considered healthy for your body size. Obesity is determined by a measurement called BMI (body mass index). BMI is an estimate of body fat and is calculated from height and weight. For adults, a BMI of 30 or higher is considered obese.  Obesity can lead to other health concerns and major illnesses, including:  Stroke.  Coronary artery disease (CAD).  Type 2 diabetes.  Some types of cancer, including cancers of the colon, breast, uterus, and gallbladder.  High blood pressure (hypertension).  High cholesterol.  Gallbladder stones.  Obesity can also contribute to:  Osteoarthritis.  Sleep apnea.  Infertility problems.  What are the causes?  Common causes of this condition include:  Eating daily meals that are high in calories, sugar, and fat.  Drinking high amounts of sugar-sweetened beverages, such as soft drinks.  Being born with genes that may make you more likely to become obese.  Having a medical condition that causes obesity, including:  Hypothyroidism.  Polycystic ovarian syndrome (PCOS).  Binge-eating disorder.  Cushing syndrome.  Taking certain medicines, such as steroids, antidepressants, and seizure medicines.  Not being physically active (sedentary lifestyle).  Not getting enough sleep.  What increases the risk?  The following factors may make you more likely to develop this condition:  Having a family history of obesity.  Living in an area with limited access to:  Neves, recreation centers, or sidewalks.  Healthy food choices, such as grocery stores and [employer].  What are the signs or symptoms?  The main sign of this condition is having too much body fat.  How is this diagnosed?  This condition is diagnosed based on:  Your BMI. If you are an adult with a BMI of 30 or higher, you are considered obese.  Your waist circumference. This measures the  distance around your waistline.  Your skinfold thickness. Your health care provider may gently pinch a fold of your skin and measure it.  You may have other tests to check for underlying conditions.  How is this treated?  Treatment for this condition often includes changing your lifestyle. Treatment may include some or all of the following:  Dietary changes. This may include developing a healthy meal plan.  Regular physical activity. This may include activity that causes your heart to beat faster (aerobic exercise) and strength training. Work with your health care provider to design an exercise program that works for you.  Medicine to help you lose weight if you are unable to lose one pound a week after six weeks of healthy eating and more physical activity.  Treating conditions that cause the obesity (underlying conditions).  Surgery. Surgical options may include gastric banding and gastric bypass. Surgery may be done if:  Other treatments have not helped to improve your condition.  You have a BMI of 40 or higher.  You have life-threatening health problems related to obesity.  Follow these instructions at home:  Eating and drinking    Follow recommendations from your health care provider about what you eat and drink. Your health care provider may advise you to:  Limit fast food, sweets, and processed snack foods.  Choose low-fat options, such as low-fat milk instead of whole milk.  Eat five or more servings of fruits or vegetables every day.  Choose healthy foods when you eat out.  Keep low-fat snacks available.  Limit sugary drinks, such as soda, fruit juice, sweetened iced tea, and flavored milk.  Drink enough water to keep your urine pale yellow.  Do not follow a fad diet. Fad diets can be unhealthy and even dangerous.  Other healthful choices include:  Eat at home more often. This gives you more control over what you eat.  Learn to read food labels. This will help you understand how much food is considered one  serving.  Learn what a healthy serving size is.  Physical activity  Exercise regularly, as told by your health care provider.  Most adults should get up to 150 minutes of moderate-intensity exercise every week.  Ask your health care provider what types of exercise are safe for you and how often you should exercise.  Warm up and stretch before being active.  Cool down and stretch after being active.  Rest between periods of activity.  Lifestyle  Work with your health care provider and a dietitian to set a weight-loss goal that is healthy and reasonable for you.  Limit your screen time.  Find ways to reward yourself that do not involve food.  Do not drink alcohol if:  Your health care provider tells you not to drink.  You are pregnant, may be pregnant, or are planning to become pregnant.  If you drink alcohol:  Limit how much you have to:  0-1 drink a day for women.  0-2 drinks a day for men.  Know how much alcohol is in your drink. In the [place] one drink equals one 12 oz bottle of beer (355 mL), one 5 oz glass of wine (148 mL), or one 1½ oz glass of hard liquor (44 mL).  General instructions  Keep a weight-loss journal to keep track of the food you eat and how much exercise you get.  Take over-the-counter and prescription medicines only as told by your health care provider.  Take vitamins and supplements only as told by your health care provider.  Consider joining a support group. Your health care provider may be able to recommend a support group.  Pay attention to your mental health as obesity can lead to depression or self esteem issues.  Keep all follow-up visits. This is important.  Contact a health care provider if:  You are unable to meet your weight-loss goal after six weeks of dietary and lifestyle changes.  You have trouble breathing.  Summary  Obesity is the condition of having too much total body fat.  Being overweight or obese means that your weight is greater than what is considered healthy for your body  size.  Work with your health care provider and a dietitian to set a weight-loss goal that is healthy and reasonable for you.  Exercise regularly, as told by your health care provider. Ask your health care provider what types of exercise are safe for you and how often you should exercise.  This information is not intended to replace advice given to you by your health care provider. Make sure you discuss any questions you have with your health care provider.  Document Revised: 07/26/2022 Document Reviewed: 07/26/2022  Elsevier Patient Education © 2024 Elsevier Inc.

## 2025-01-03 NOTE — PROGRESS NOTES
Chief Complaint   Patient presents with    Vitamin D Deficiency    elevated triglycerides       Subjective   Benjamin Rivera is a 44 y.o. male    History of Present Illness  Patient today to follow-up on recent labs.  He had labs collected on September 26 and at that time his vitamin D was a little low at 25.9, but on his cholesterol panel his triglycerides were 1231 with a total cholesterol of 287.  CMP was totally normal his TSH was 4.690 with a T4 of 0.79.  His CBC was totally normal.  The calculated LDL cholesterol was 90. Weight up 6 lbs.     No Known Allergies  Past Medical History:   Diagnosis Date    Hypothyroidism     Obesity       Past Surgical History:   Procedure Laterality Date    WISDOM TOOTH EXTRACTION       Social History     Socioeconomic History    Marital status:    Tobacco Use    Smoking status: Never     Passive exposure: Never    Smokeless tobacco: Never   Vaping Use    Vaping status: Never Used   Substance and Sexual Activity    Alcohol use: Not Currently    Drug use: Never    Sexual activity: Yes     Partners: Female     Birth control/protection: Post-menopausal        Current Outpatient Medications:     Cholecalciferol (Vitamin D3) 50 MCG (2000 UT) chewable tablet, , Disp: , Rfl:     levothyroxine (SYNTHROID, LEVOTHROID) 88 MCG tablet, Take 1 tablet by mouth Daily., Disp: 90 tablet, Rfl: 1     Review of Systems   Constitutional:  Negative for appetite change, diaphoresis, fatigue and unexpected weight change.   Eyes:  Negative for visual disturbance.   Respiratory:  Negative for cough, chest tightness, shortness of breath and wheezing.    Cardiovascular:  Negative for chest pain, palpitations and leg swelling.   Gastrointestinal:  Negative for constipation, diarrhea, nausea and vomiting.   Endocrine: Negative for polydipsia, polyphagia and polyuria.   Genitourinary:  Negative for difficulty urinating and dysuria.   Skin:  Negative for color change.   Neurological:  Negative for  dizziness, weakness, light-headedness and numbness.   Psychiatric/Behavioral:  Negative for sleep disturbance.        Objective     Vitals:    01/03/25 0756   BP: 124/86   Pulse: 90   Resp: 18   SpO2: 99%         01/03/25  0756   Weight: (!) 138 kg (305 lb)     Body mass index is 47.87 kg/m².  Results for orders placed or performed in visit on 09/26/24   Comprehensive Metabolic Panel    Collection Time: 09/26/24 10:20 AM    Specimen: Arm, Left; Blood   Result Value Ref Range    Glucose 96 65 - 99 mg/dL    BUN 14 6 - 20 mg/dL    Creatinine 0.93 0.76 - 1.27 mg/dL    Sodium 138 136 - 145 mmol/L    Potassium 4.0 3.5 - 5.2 mmol/L    Chloride 102 98 - 107 mmol/L    CO2 25.0 22.0 - 29.0 mmol/L    Calcium 9.6 8.6 - 10.5 mg/dL    Total Protein 6.8 6.0 - 8.5 g/dL    Albumin 4.3 3.5 - 5.2 g/dL    ALT (SGPT) 41 1 - 41 U/L    AST (SGOT) 25 1 - 40 U/L    Alkaline Phosphatase 73 39 - 117 U/L    Total Bilirubin 0.3 0.0 - 1.2 mg/dL    Globulin 2.5 gm/dL    A/G Ratio 1.7 g/dL    BUN/Creatinine Ratio 15.1 7.0 - 25.0    Anion Gap 11.0 5.0 - 15.0 mmol/L    eGFR 103.2 >60.0 mL/min/1.73   Lipid Panel    Collection Time: 09/26/24 10:20 AM    Specimen: Arm, Left; Blood   Result Value Ref Range    Total Cholesterol 287 (H) 0 - 200 mg/dL    Triglycerides 1,231 (H) 0 - 150 mg/dL    HDL Cholesterol 43 40 - 60 mg/dL    LDL Cholesterol       VLDL Cholesterol      LDL/HDL Ratio     Vitamin D,25-Hydroxy    Collection Time: 09/26/24 10:20 AM    Specimen: Arm, Left; Blood   Result Value Ref Range    25 Hydroxy, Vitamin D 25.9 (L) 30.0 - 100.0 ng/ml   TSH    Collection Time: 09/26/24 10:20 AM    Specimen: Arm, Left; Blood   Result Value Ref Range    TSH 4.690 (H) 0.270 - 4.200 uIU/mL   T4, Free    Collection Time: 09/26/24 10:20 AM    Specimen: Arm, Left; Blood   Result Value Ref Range    Free T4 0.79 (L) 0.92 - 1.68 ng/dL   CBC (No Diff)    Collection Time: 09/26/24 10:20 AM    Specimen: Arm, Left; Blood   Result Value Ref Range    WBC 7.26 3.40 -  10.80 10*3/mm3    RBC 5.11 4.14 - 5.80 10*6/mm3    Hemoglobin 16.3 13.0 - 17.7 g/dL    Hematocrit 46.0 37.5 - 51.0 %    MCV 90.0 79.0 - 97.0 fL    MCH 31.9 26.6 - 33.0 pg    MCHC 35.4 31.5 - 35.7 g/dL    RDW 13.1 12.3 - 15.4 %    RDW-SD 42.6 37.0 - 54.0 fl    MPV 10.1 6.0 - 12.0 fL    Platelets 217 140 - 450 10*3/mm3   LDL Cholesterol, Direct    Collection Time: 09/26/24 10:20 AM    Specimen: Arm, Left; Blood   Result Value Ref Range    LDL Cholesterol  90 0 - 100 mg/dL       Physical Exam  Vitals reviewed.   Constitutional:       Appearance: He is well-developed.   HENT:      Head: Normocephalic and atraumatic.   Cardiovascular:      Rate and Rhythm: Normal rate and regular rhythm.      Heart sounds: Normal heart sounds.   Pulmonary:      Effort: Pulmonary effort is normal.      Breath sounds: Normal breath sounds.   Skin:     General: Skin is warm and dry.   Neurological:      Mental Status: He is alert and oriented to person, place, and time.   Psychiatric:         Behavior: Behavior normal.         Assessment & Plan   Problems Addressed this Visit          Endocrine and Metabolic    Acquired hypothyroidism - Primary    Relevant Medications    levothyroxine (SYNTHROID, LEVOTHROID) 88 MCG tablet    Other Relevant Orders    TSH    T4, Free    Class 3 severe obesity due to excess calories without serious comorbidity with body mass index (BMI) of 45.0 to 49.9 in adult     Other Visit Diagnoses       Elevated triglycerides with high cholesterol        Relevant Orders    Lipid Panel          Diagnoses         Codes Comments    Acquired hypothyroidism    -  Primary ICD-10-CM: E03.9  ICD-9-CM: 244.9     Elevated triglycerides with high cholesterol     ICD-10-CM: E78.2  ICD-9-CM: 272.2     Class 3 severe obesity due to excess calories without serious comorbidity with body mass index (BMI) of 45.0 to 49.9 in adult     ICD-10-CM: E66.813, Z68.42, E66.01  ICD-9-CM: 278.01, V85.42         We will repeat a lipid panel today to  make sure that his triglycerides are accurate.  Will obtain routine testing today and make further recommendations pending results. All results are automatically released to [facility] immediately when they return whether they have been reviewed or not. The patient will be notified about the results, regardless of the findings. If they have not been contacted by the office within 2 weeks after the test has been performed, I want them to contact us to learn about the results.    Discussed need for weight management.  I recommend they use a weight loss aayush on their phone, eat no more than [id number] rene/day, or use intermittent fasting and exercise 30 to 60 minutes 3 times a week.  Discussed weight loss with diet, recommend Weight Watchers or Mediterranean Diet, but stated that whatever method works for this patient is best. The patient agrees with all recommendations at this time. I have discussed a weight loss plan to be determined by this patient.     We will go ahead and increase his levothyroxine to 88 mcg daily and repeat a level in about 6 weeks.  Will obtain routine testing today and make further recommendations pending results. All results are automatically released to [facility] immediately when they return whether they have been reviewed or not. The patient will be notified about the results, regardless of the findings. If they have not been contacted by the office within 2 weeks after the test has been performed, I want them to contact us to learn about the results.    Return visit in as scheduled or as needed    Counseling provided on weight management and hyperlipidemia.    Jigar Schmitz, APRN   1/3/2025   09:19 EST     Please note that portions of this document were completed with a voice recognition program.     At [facility], we believe that sharing information builds trust and better relationships. You are receiving this note because you are receiving care at [facility] or have recently  visited. It is possible you will see health information before a provider has talked with you about it. This kind of information can be easy to misunderstand as it is a medical document. It is intended as [id number] communication. It is written in medical language and may contain abbreviations or verbiage that are unfamiliar. It may appear blunt or direct. Medical documents are intended to carry relevant information, facts as evident, and the clinical opinion of the practitioner.  To help you fully understand what it means for your health, we urge you to discuss this note with your provider.

## 2025-01-06 DIAGNOSIS — E78.2 ELEVATED TRIGLYCERIDES WITH HIGH CHOLESTEROL: Primary | ICD-10-CM

## 2025-01-06 RX ORDER — FENOFIBRATE 145 MG/1
145 TABLET, COATED ORAL DAILY
Qty: 30 TABLET | Refills: 2 | Status: SHIPPED | OUTPATIENT
Start: 2025-01-06

## 2025-01-06 NOTE — PROGRESS NOTES
His triglycerides are still very high. I will send in Fenofibrate. Have his take as scheduled. TSH is high but circulating thyroid is in the normal range.

## 2025-01-07 ENCOUNTER — TELEPHONE (OUTPATIENT)
Age: 45
End: 2025-01-07
Payer: COMMERCIAL

## 2025-01-07 NOTE — TELEPHONE ENCOUNTER
CALLED AND LVM    RELAY    ----- Message from Jigar Schmitz  His triglycerides are still very high. I will send in Fenofibrate. Have his take as scheduled. TSH is high but circulating thyroid is in the normal range.

## 2025-01-08 NOTE — TELEPHONE ENCOUNTER
2nd attempt  CALLED AND LVM     RELAY     ----- Message from Jigar Schmitz  His triglycerides are still very high. I will send in Fenofibrate. Have his take as scheduled. TSH is high but circulating thyroid is in the normal range.

## 2025-01-08 NOTE — TELEPHONE ENCOUNTER
Name: RiveraWhitneykashif Bro      Relationship: Self      Best Callback Number:   Telephone Information:   Mobile [phone]       HUB PROVIDED THE RELAY MESSAGE FROM THE OFFICE      PATIENT: VOICED UNDERSTANDING AND HAS NO FURTHER QUESTIONS AT THIS TIME    ADDITIONAL INFORMATION:

## 2025-03-31 DIAGNOSIS — E78.2 ELEVATED TRIGLYCERIDES WITH HIGH CHOLESTEROL: ICD-10-CM

## 2025-04-01 RX ORDER — FENOFIBRATE 145 MG/1
145 TABLET, COATED ORAL DAILY
Qty: 30 TABLET | Refills: 2 | Status: SHIPPED | OUTPATIENT
Start: 2025-04-01

## 2025-04-01 NOTE — TELEPHONE ENCOUNTER
Rx Refill Note  Requested Prescriptions     Pending Prescriptions Disp Refills    fenofibrate (Tricor) 145 MG tablet 30 tablet 2     Sig: Take 1 tablet by mouth Daily.      Last office visit with prescribing clinician: 1/3/2025   Last telemedicine visit with prescribing clinician: Visit date not found   Next office visit with prescribing clinician: 10/1/2025   {  Long Dee Jr, MA  04/01/25, 08:58 EDT    RX sent

## 2025-07-03 DIAGNOSIS — E78.2 ELEVATED TRIGLYCERIDES WITH HIGH CHOLESTEROL: ICD-10-CM

## 2025-07-03 DIAGNOSIS — E03.9 ACQUIRED HYPOTHYROIDISM: ICD-10-CM

## 2025-07-03 RX ORDER — LEVOTHYROXINE SODIUM 88 UG/1
88 TABLET ORAL DAILY
Qty: 90 TABLET | Refills: 1 | Status: SHIPPED | OUTPATIENT
Start: 2025-07-03

## 2025-07-03 RX ORDER — FENOFIBRATE 145 MG/1
145 TABLET, FILM COATED ORAL DAILY
Qty: 30 TABLET | Refills: 2 | Status: SHIPPED | OUTPATIENT
Start: 2025-07-03

## 2025-07-03 NOTE — TELEPHONE ENCOUNTER
Caller: Miguel Benjamin Dieudonne    Relationship: Self    Best call back number: [phone]     Requested Prescriptions:   Requested Prescriptions     Pending Prescriptions Disp Refills    fenofibrate (Tricor) 145 MG tablet 30 tablet 2     Sig: Take 1 tablet by mouth Daily.    levothyroxine (SYNTHROID, LEVOTHROID) 88 MCG tablet 90 tablet 1     Sig: Take 1 tablet by mouth Daily.        Pharmacy where request should be sent: [facility]     Last office visit with prescribing clinician: 1/3/2025   Last telemedicine visit with prescribing clinician: Visit date not found   Next office visit with prescribing clinician: 10/1/2025     Does the patient have less than a 3 day supply:  [x] Yes  [] No    Would you like a call back once the refill request has been completed: [] Yes [x] No    If the office needs to give you a call back, can they leave a voicemail: [] Yes [x] No    Raleigh Castle Rep   07/03/25 09:49 EDT